# Patient Record
Sex: MALE | Race: WHITE | NOT HISPANIC OR LATINO | Employment: FULL TIME | URBAN - METROPOLITAN AREA
[De-identification: names, ages, dates, MRNs, and addresses within clinical notes are randomized per-mention and may not be internally consistent; named-entity substitution may affect disease eponyms.]

---

## 2019-10-17 ENCOUNTER — HOSPITAL ENCOUNTER (INPATIENT)
Facility: HOSPITAL | Age: 54
LOS: 12 days | Discharge: HOME | DRG: 753 | End: 2019-10-29
Attending: EMERGENCY MEDICINE | Admitting: PSYCHIATRY & NEUROLOGY
Payer: COMMERCIAL

## 2019-10-17 DIAGNOSIS — R45.851 DEPRESSION WITH SUICIDAL IDEATION: ICD-10-CM

## 2019-10-17 DIAGNOSIS — F10.10 ALCOHOL ABUSE: ICD-10-CM

## 2019-10-17 DIAGNOSIS — G25.9 EXTRAPYRAMIDAL AND MOVEMENT DISORDER: ICD-10-CM

## 2019-10-17 DIAGNOSIS — G25.81 RESTLESS LEG: ICD-10-CM

## 2019-10-17 DIAGNOSIS — F41.9 ANXIETY: ICD-10-CM

## 2019-10-17 DIAGNOSIS — R03.0 ELEVATED BLOOD PRESSURE READING: ICD-10-CM

## 2019-10-17 DIAGNOSIS — F31.5 BIPOLAR I DISORDER, MOST RECENT EPISODE DEPRESSED, SEVERE WITH PSYCHOTIC FEATURES (HCC): Primary | Chronic | ICD-10-CM

## 2019-10-17 DIAGNOSIS — F32.A DEPRESSION WITH SUICIDAL IDEATION: ICD-10-CM

## 2019-10-17 DIAGNOSIS — K70.0 ALCOHOL INDUCED FATTY LIVER: ICD-10-CM

## 2019-10-17 DIAGNOSIS — T14.91XA ATTEMPTED SUICIDE (HCC): ICD-10-CM

## 2019-10-17 DIAGNOSIS — F10.929 ALCOHOL INTOXICATION (HCC): ICD-10-CM

## 2019-10-17 LAB
ALBUMIN SERPL BCP-MCNC: 4 G/DL (ref 3–5.2)
ALP SERPL-CCNC: 62 U/L (ref 43–122)
ALT SERPL W P-5'-P-CCNC: 34 U/L (ref 9–52)
AMPHETAMINES SERPL QL SCN: POSITIVE
ANION GAP SERPL CALCULATED.3IONS-SCNC: 6 MMOL/L (ref 5–14)
AST SERPL W P-5'-P-CCNC: 32 U/L (ref 17–59)
BACTERIA UR QL AUTO: ABNORMAL /HPF
BARBITURATES UR QL: NEGATIVE
BASOPHILS # BLD AUTO: 0 THOUSANDS/ΜL (ref 0–0.1)
BASOPHILS NFR BLD AUTO: 1 % (ref 0–1)
BENZODIAZ UR QL: NEGATIVE
BILIRUB SERPL-MCNC: 0.4 MG/DL
BILIRUB UR QL STRIP: NEGATIVE
BUN SERPL-MCNC: 8 MG/DL (ref 5–25)
CALCIUM SERPL-MCNC: 8.7 MG/DL (ref 8.4–10.2)
CHLORIDE SERPL-SCNC: 107 MMOL/L (ref 97–108)
CLARITY UR: CLEAR
CO2 SERPL-SCNC: 29 MMOL/L (ref 22–30)
COCAINE UR QL: NEGATIVE
COLOR UR: ABNORMAL
CREAT SERPL-MCNC: 0.74 MG/DL (ref 0.7–1.5)
EOSINOPHIL # BLD AUTO: 0.1 THOUSAND/ΜL (ref 0–0.4)
EOSINOPHIL NFR BLD AUTO: 3 % (ref 0–6)
ERYTHROCYTE [DISTWIDTH] IN BLOOD BY AUTOMATED COUNT: 12.8 %
ETHANOL EXG-MCNC: 0.07 MG/DL
ETHANOL EXG-MCNC: 0.09 MG/DL
GFR SERPL CREATININE-BSD FRML MDRD: 106 ML/MIN/1.73SQ M
GLUCOSE SERPL-MCNC: 94 MG/DL (ref 70–99)
GLUCOSE UR STRIP-MCNC: NEGATIVE MG/DL
HCT VFR BLD AUTO: 42.6 % (ref 41–53)
HGB BLD-MCNC: 14.6 G/DL (ref 13.5–17.5)
HGB UR QL STRIP.AUTO: NEGATIVE
KETONES UR STRIP-MCNC: ABNORMAL MG/DL
LEUKOCYTE ESTERASE UR QL STRIP: 100
LYMPHOCYTES # BLD AUTO: 2.2 THOUSANDS/ΜL (ref 0.5–4)
LYMPHOCYTES NFR BLD AUTO: 44 % (ref 25–45)
MACROCYTES BLD QL AUTO: PRESENT
MAGNESIUM SERPL-MCNC: 2 MG/DL (ref 1.6–2.3)
MCH RBC QN AUTO: 35.3 PG (ref 26–34)
MCHC RBC AUTO-ENTMCNC: 34.2 G/DL (ref 31–36)
MCV RBC AUTO: 103 FL (ref 80–100)
METHADONE UR QL: NEGATIVE
MONOCYTES # BLD AUTO: 0.3 THOUSAND/ΜL (ref 0.2–0.9)
MONOCYTES NFR BLD AUTO: 7 % (ref 1–10)
MUCOUS THREADS UR QL AUTO: ABNORMAL
NEUTROPHILS # BLD AUTO: 2.3 THOUSANDS/ΜL (ref 1.8–7.8)
NEUTS SEG NFR BLD AUTO: 46 % (ref 45–65)
NITRITE UR QL STRIP: NEGATIVE
NON-SQ EPI CELLS URNS QL MICRO: ABNORMAL /HPF
OPIATES UR QL SCN: NEGATIVE
PCP UR QL: NEGATIVE
PH UR STRIP.AUTO: 5 [PH]
PLATELET # BLD AUTO: 284 THOUSANDS/UL (ref 150–450)
PLATELET BLD QL SMEAR: ADEQUATE
PMV BLD AUTO: 8.5 FL (ref 8.9–12.7)
POTASSIUM SERPL-SCNC: 4.1 MMOL/L (ref 3.6–5)
PROT SERPL-MCNC: 6.8 G/DL (ref 5.9–8.4)
PROT UR STRIP-MCNC: NEGATIVE MG/DL
RBC # BLD AUTO: 4.13 MILLION/UL (ref 4.5–5.9)
RBC #/AREA URNS AUTO: ABNORMAL /HPF
RBC MORPH BLD: NORMAL
SODIUM SERPL-SCNC: 142 MMOL/L (ref 137–147)
SP GR UR STRIP.AUTO: 1.02 (ref 1–1.04)
THC UR QL: NEGATIVE
UROBILINOGEN UA: NEGATIVE MG/DL
WBC # BLD AUTO: 5 THOUSAND/UL (ref 4.5–11)
WBC #/AREA URNS AUTO: ABNORMAL /HPF

## 2019-10-17 PROCEDURE — 81001 URINALYSIS AUTO W/SCOPE: CPT | Performed by: EMERGENCY MEDICINE

## 2019-10-17 PROCEDURE — 82075 ASSAY OF BREATH ETHANOL: CPT | Performed by: EMERGENCY MEDICINE

## 2019-10-17 PROCEDURE — 80307 DRUG TEST PRSMV CHEM ANLYZR: CPT | Performed by: EMERGENCY MEDICINE

## 2019-10-17 PROCEDURE — 93005 ELECTROCARDIOGRAM TRACING: CPT

## 2019-10-17 PROCEDURE — 99285 EMERGENCY DEPT VISIT HI MDM: CPT

## 2019-10-17 PROCEDURE — 85025 COMPLETE CBC W/AUTO DIFF WBC: CPT | Performed by: EMERGENCY MEDICINE

## 2019-10-17 PROCEDURE — 36415 COLL VENOUS BLD VENIPUNCTURE: CPT | Performed by: EMERGENCY MEDICINE

## 2019-10-17 PROCEDURE — 80053 COMPREHEN METABOLIC PANEL: CPT | Performed by: EMERGENCY MEDICINE

## 2019-10-17 PROCEDURE — 99285 EMERGENCY DEPT VISIT HI MDM: CPT | Performed by: EMERGENCY MEDICINE

## 2019-10-17 PROCEDURE — 83735 ASSAY OF MAGNESIUM: CPT | Performed by: EMERGENCY MEDICINE

## 2019-10-17 PROCEDURE — 87086 URINE CULTURE/COLONY COUNT: CPT | Performed by: EMERGENCY MEDICINE

## 2019-10-17 RX ORDER — LORAZEPAM 2 MG/1
2 TABLET ORAL ONCE
Status: COMPLETED | OUTPATIENT
Start: 2019-10-17 | End: 2019-10-17

## 2019-10-17 RX ORDER — HALOPERIDOL 5 MG
5 TABLET ORAL EVERY 8 HOURS PRN
Status: DISCONTINUED | OUTPATIENT
Start: 2019-10-17 | End: 2019-10-29 | Stop reason: HOSPADM

## 2019-10-17 RX ORDER — MAGNESIUM HYDROXIDE/ALUMINUM HYDROXICE/SIMETHICONE 120; 1200; 1200 MG/30ML; MG/30ML; MG/30ML
30 SUSPENSION ORAL EVERY 6 HOURS PRN
Status: ACTIVE | OUTPATIENT
Start: 2019-10-17 | End: 2019-10-19

## 2019-10-17 RX ORDER — LORAZEPAM 0.5 MG/1
1 TABLET ORAL ONCE
Status: COMPLETED | OUTPATIENT
Start: 2019-10-17 | End: 2019-10-17

## 2019-10-17 RX ORDER — IBUPROFEN 400 MG/1
400 TABLET ORAL EVERY 8 HOURS PRN
Status: DISCONTINUED | OUTPATIENT
Start: 2019-10-17 | End: 2019-10-18

## 2019-10-17 RX ORDER — BENZTROPINE MESYLATE 2 MG/1
2 TABLET ORAL EVERY 6 HOURS PRN
Status: DISCONTINUED | OUTPATIENT
Start: 2019-10-17 | End: 2019-10-18

## 2019-10-17 RX ORDER — ACETAMINOPHEN 325 MG/1
650 TABLET ORAL EVERY 6 HOURS PRN
Status: DISCONTINUED | OUTPATIENT
Start: 2019-10-17 | End: 2019-10-29 | Stop reason: HOSPADM

## 2019-10-17 RX ORDER — THIAMINE MONONITRATE (VIT B1) 100 MG
100 TABLET ORAL DAILY
Status: DISCONTINUED | OUTPATIENT
Start: 2019-10-17 | End: 2019-10-29 | Stop reason: HOSPADM

## 2019-10-17 RX ORDER — RISPERIDONE 1 MG/1
1 TABLET, ORALLY DISINTEGRATING ORAL EVERY 12 HOURS PRN
Status: DISCONTINUED | OUTPATIENT
Start: 2019-10-17 | End: 2019-10-29 | Stop reason: HOSPADM

## 2019-10-17 RX ORDER — LORAZEPAM 0.5 MG/1
1 TABLET ORAL EVERY 8 HOURS PRN
Status: DISCONTINUED | OUTPATIENT
Start: 2019-10-17 | End: 2019-10-17

## 2019-10-17 RX ORDER — ACETAMINOPHEN 325 MG/1
650 TABLET ORAL EVERY 4 HOURS PRN
Status: DISCONTINUED | OUTPATIENT
Start: 2019-10-17 | End: 2019-10-17

## 2019-10-17 RX ORDER — HALOPERIDOL 5 MG/ML
5 INJECTION INTRAMUSCULAR EVERY 8 HOURS PRN
Status: DISCONTINUED | OUTPATIENT
Start: 2019-10-17 | End: 2019-10-18

## 2019-10-17 RX ORDER — LORAZEPAM 0.5 MG/1
0.5 TABLET ORAL EVERY 8 HOURS PRN
Status: DISCONTINUED | OUTPATIENT
Start: 2019-10-17 | End: 2019-10-17

## 2019-10-17 RX ORDER — LORAZEPAM 1 MG/1
1 TABLET ORAL EVERY 6 HOURS PRN
Status: DISCONTINUED | OUTPATIENT
Start: 2019-10-17 | End: 2019-10-18

## 2019-10-17 RX ORDER — FOLIC ACID 1 MG/1
1 TABLET ORAL DAILY
Status: DISCONTINUED | OUTPATIENT
Start: 2019-10-17 | End: 2019-10-29 | Stop reason: HOSPADM

## 2019-10-17 RX ORDER — NICOTINE 21 MG/24HR
21 PATCH, TRANSDERMAL 24 HOURS TRANSDERMAL ONCE
Status: COMPLETED | OUTPATIENT
Start: 2019-10-17 | End: 2019-10-18

## 2019-10-17 RX ORDER — HYDROXYZINE HYDROCHLORIDE 25 MG/1
25 TABLET, FILM COATED ORAL EVERY 6 HOURS PRN
Status: DISCONTINUED | OUTPATIENT
Start: 2019-10-17 | End: 2019-10-18

## 2019-10-17 RX ORDER — ACETAMINOPHEN 325 MG/1
650 TABLET ORAL EVERY 8 HOURS PRN
Status: DISCONTINUED | OUTPATIENT
Start: 2019-10-17 | End: 2019-10-17

## 2019-10-17 RX ORDER — IBUPROFEN 400 MG/1
800 TABLET ORAL EVERY 8 HOURS PRN
Status: DISCONTINUED | OUTPATIENT
Start: 2019-10-17 | End: 2019-10-18

## 2019-10-17 RX ORDER — MAGNESIUM HYDROXIDE/ALUMINUM HYDROXICE/SIMETHICONE 120; 1200; 1200 MG/30ML; MG/30ML; MG/30ML
15 SUSPENSION ORAL EVERY 4 HOURS PRN
Status: DISCONTINUED | OUTPATIENT
Start: 2019-10-17 | End: 2019-10-17

## 2019-10-17 RX ORDER — ACETAMINOPHEN 325 MG/1
325 TABLET ORAL EVERY 6 HOURS PRN
Status: DISCONTINUED | OUTPATIENT
Start: 2019-10-17 | End: 2019-10-17

## 2019-10-17 RX ORDER — ATORVASTATIN CALCIUM 20 MG/1
40 TABLET, FILM COATED ORAL
Status: DISCONTINUED | OUTPATIENT
Start: 2019-10-17 | End: 2019-10-17

## 2019-10-17 RX ORDER — ACETAMINOPHEN 325 MG/1
650 TABLET ORAL EVERY 6 HOURS PRN
Status: DISCONTINUED | OUTPATIENT
Start: 2019-10-17 | End: 2019-10-17

## 2019-10-17 RX ORDER — BENZTROPINE MESYLATE 1 MG/ML
2 INJECTION INTRAMUSCULAR; INTRAVENOUS EVERY 6 HOURS PRN
Status: DISCONTINUED | OUTPATIENT
Start: 2019-10-17 | End: 2019-10-18

## 2019-10-17 RX ADMIN — LORAZEPAM 0.5 MG: 0.5 TABLET ORAL at 14:07

## 2019-10-17 RX ADMIN — LORAZEPAM 1 MG: 0.5 TABLET ORAL at 17:25

## 2019-10-17 RX ADMIN — Medication 1 TABLET: at 14:10

## 2019-10-17 RX ADMIN — FOLIC ACID 1 MG: 1 TABLET ORAL at 14:07

## 2019-10-17 RX ADMIN — NICOTINE 21 MG: 21 PATCH, EXTENDED RELEASE TRANSDERMAL at 14:06

## 2019-10-17 RX ADMIN — THIAMINE HCL TAB 100 MG 100 MG: 100 TAB at 14:07

## 2019-10-17 RX ADMIN — LORAZEPAM 2 MG: 1 TABLET ORAL at 21:16

## 2019-10-17 NOTE — ED NOTES
Patient presents via EMS from his work site where he as well as a supervisor reported he was attempting to hang himself  He was reportedly standing atop a truck with a makeshift noose artound his neck  Patient is a fair historian  There is a history of mental health treatment for bipolar disorder and a history of polysubstance abuse including alcohol, methamphetamines, benzodiazepines, opioids, amphetamines and marijuana  He minimizes all of these with the exception of alcohol , reporting that he consumes about a 5th of whiskey a day without any significant periods of sobriety  Patient states he has been dealing with depression since about age 11 or 8  He reports that about 10 yerars ago, he and his wife  and his depression worsened to the point that he had a hanging attempt  (11/2008)  PAtient reports he moved to the area for his current job and has been there for about 3 days  He stated that he recently learned his ex has hooked up with his best friend and this upset him  Patient's BAT was initially 0 089  UDS was positive for AMphetamine/methamphetamine, which he denies using  He reports drinking alcohol heavily beginning at about age 29  History indicates a history of regular blackouts  He denied he has experienced DT's but stated he had an alcohol withdrawal-related seizure about 2 months ago  Per the ED attending, per history, the patient had instead overdosed on opioids and was revivied with Narcan  In addition to depressed mood and suicidal thoughts with attempt, patient reports poor sleep, chronic worry and feelings of hopelessness and helplessness  He  has been tearful throughout his ED presentation  He reports he experienced hallucinations about a week  ago: "I saw shadows and heard things " He has no current outpatient provider or access to care in the area

## 2019-10-17 NOTE — ED PROVIDER NOTES
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  He states that he tied a rope around her neck and was ready to jump out of a truck  He states his co-worker stopped him  He is very tearful o exam   He is not homicidal, no visual hallucinations or auditory hallucinations  He states that his co-worker removed the rope around his neck and called EMS  Patient reports that he has had suicide attempts in the past where he broke his neck and back after attempting to hang himself  He does admit to several shots of whiskey this morning      History provided by:  EMS personnel and patient  Suicide Attempt   Presenting symptoms: depression, suicidal thoughts, suicidal threats and suicide attempt    Patient accompanied by: Friend/co-worker  Onset quality:  Unable to specify  Timing:  Constant  Progression:  Worsening  Chronicity:  Recurrent  Context: alcohol use and stressful life event    Treatment compliance:  Untreated  Time since last dose of psychoactive medication: Unknown  Relieved by:  Nothing  Worsened by:  Nothing  Ineffective treatments:  None tried  Associated symptoms: anhedonia, anxiety, decreased need for sleep, feelings of worthlessness, irritability and poor judgment    Risk factors: hx of mental illness and hx of suicide attempts        None       Past Medical History:   Diagnosis Date    Alcohol abuse     Depression     Psychiatric illness     Suicide attempt ([facility] [address] )        History reviewed   No pertinent surgical history  History reviewed  No pertinent family history  I have reviewed and agree with the history as documented  Social History     Tobacco Use    Smoking status: Current Every Day Smoker    Smokeless tobacco: Never Used   Substance Use Topics    Alcohol use: Yes     Frequency: 4 or more times a week     Binge frequency: Daily or almost daily     Comment: Patient reports 1/5 th whiskey daily    Drug use: Yes     Types: Heroin, Cocaine, Marijuana, Methamphetamines, Prescription, Amphetamines, Benzodiazepines, Oxycodone     Comment: Patient states "i've done it all, just not lately" UDS was positive for  meth/amphetamines        Review of Systems   Constitutional: Positive for irritability  Psychiatric/Behavioral: Positive for suicidal ideas  The patient is nervous/anxious  Physical Exam  Physical Exam   Constitutional: He is oriented to person, place, and time  He appears well-developed  No distress  Patient appears older than stated age  He smells of tobacco and alcohol   HENT:   Head: Normocephalic and atraumatic  Mouth/Throat: Oropharynx is clear and moist    Patient maintaining airway maintaining secretions  Uvula midline without edema  Airway pain  Eyes: Pupils are equal, round, and reactive to light  Conjunctivae and EOM are normal    Neck: Trachea normal and normal range of motion  Neck supple  No muscular tenderness present  No neck rigidity  Normal range of motion present  No thyroid mass present  Cardiovascular: Normal rate, regular rhythm, normal heart sounds and intact distal pulses  No murmur heard  Pulses:       Radial pulses are 2+ on the right side, and 2+ on the left side  Dorsalis pedis pulses are 2+ on the right side, and 2+ on the left side  Pulmonary/Chest: Effort normal and breath sounds normal  No stridor  No respiratory distress  Abdominal: Soft  Bowel sounds are normal  He exhibits no distension  There is no tenderness  Musculoskeletal: Normal range of motion  He exhibits no edema  Neurological: He is alert and oriented to person, place, and time  He has normal strength  No cranial nerve deficit or sensory deficit  GCS eye subscore is 4  GCS verbal subscore is 5  GCS motor subscore is 6  Patient is neuro intact with no focal deficits  No slurred speech  No facial asymmetry  GCS 15  No ataxia    Skin: Skin is warm  Capillary refill takes less than 2 seconds  He is not diaphoretic  Psychiatric: His mood appears anxious  His speech is tangential  He is withdrawn  He expresses impulsivity  He exhibits a depressed mood  He expresses suicidal ideation  He expresses suicidal plans  Patient is tearful throughout exam   He has poor eye contact  Nursing note and vitals reviewed        Vital Signs  ED Triage Vitals [10/17/19 1225]   Temperature Pulse Respirations Blood Pressure SpO2   98 °F (36 7 °C) 95 18 149/96 99 %      Temp Source Heart Rate Source Patient Position - Orthostatic VS BP Location FiO2 (%)   Tympanic Monitor Lying Left arm --      Pain Score       --           Vitals:    10/17/19 1225 10/17/19 1726   BP: 149/96 117/63   Pulse: 95 99   Patient Position - Orthostatic VS: Lying Lying         Visual Acuity      ED Medications  Medications   nicotine (NICODERM CQ) 21 mg/24 hr TD 24 hr patch 21 mg (21 mg Transdermal Medication Applied 10/17/19 1406)   thiamine (VITAMIN B1) tablet 100 mg (100 mg Oral Given 10/93/61 3161)   folic acid (FOLVITE) tablet 1 mg (1 mg Oral Given 10/17/19 1407)   multivitamin-minerals (CENTRUM) tablet 1 tablet (1 tablet Oral Given 10/17/19 1410)   acetaminophen (TYLENOL) tablet 650 mg (has no administration in time range)   ibuprofen (MOTRIN) tablet 400 mg (has no administration in time range)   aluminum-magnesium hydroxide-simethicone (MYLANTA) 200-200-20 mg/5 mL oral suspension 30 mL (has no administration in time range)   LORazepam (ATIVAN) tablet 1 mg (has no administration in time range) LORazepam (ATIVAN) tablet 1 mg (1 mg Oral Given 10/17/19 1725)       Diagnostic Studies  Results Reviewed     Procedure Component Value Units Date/Time    POCT alcohol breath test [[id number]]  (Normal) Resulted:  10/17/19 1440    Lab Status:  Edited Result - FINAL Updated:  10/17/19 1445     EXTBreath Alcohol 0 066    POCT alcohol breath test [[id number]]  (Normal) Resulted:  10/17/19 1320    Lab Status:  Final result Updated:  10/17/19 1440     EXTBreath Alcohol 0 089    Urine culture [[id number]] Collected:  10/17/19 1303    Lab Status: In process Specimen:  Urine, Clean Catch Updated:  10/17/19 1438    Urine Microscopic [[id number]]  (Abnormal) Collected:  10/17/19 1303    Lab Status:  Final result Specimen:  Urine, Clean Catch Updated:  10/17/19 1405     RBC, UA 0-1 /hpf      WBC, UA 10-20 /hpf      Epithelial Cells Occasional /hpf      Bacteria, UA Occasional /hpf      MUCUS THREADS Innumerable    Rapid drug screen, urine [[id number]]  (Abnormal) Collected:  10/17/19 1303    Lab Status:  Final result Specimen:  Urine, Clean Catch Updated:  10/17/19 1403     Amph/Meth UR Positive     Barbiturate Ur Negative     Benzodiazepine Urine Negative     Cocaine Urine Negative     Methadone Urine Negative     Opiate Urine Negative     PCP Ur Negative     THC Urine Negative    Narrative:       FOR MEDICAL PURPOSES ONLY  IF CONFIRMATION NEEDED PLEASE CONTACT THE LAB WITHIN 5 DAYS      Drug Screen Cutoff Levels:  AMPHETAMINE/METHAMPHETAMINES  1000 ng/mL  BARBITURATES     200 ng/mL  BENZODIAZEPINES     200 ng/mL  COCAINE      300 ng/mL  METHADONE      300 ng/mL  OPIATES      300 ng/mL  PHENCYCLIDINE     25 ng/mL  THC       50 ng/mL      Magnesium [[id number]]  (Normal) Collected:  10/17/19 1313    Lab Status:  Final result Specimen:  Blood from Arm, Right Updated:  10/17/19 1403     Magnesium 2 0 mg/dL     Comprehensive metabolic panel [[id number]]  (Normal) Collected:  10/17/19 1313    Lab Status:  Final result Specimen: Blood from Arm, Right Updated:  10/17/19 1402     Sodium 142 mmol/L      Potassium 4 1 mmol/L      Chloride 107 mmol/L      CO2 29 mmol/L      ANION GAP 6 mmol/L      BUN 8 mg/dL      Creatinine 0 74 mg/dL      Glucose 94 mg/dL      Calcium 8 7 mg/dL      AST 32 U/L      ALT 34 U/L      Alkaline Phosphatase 62 U/L      Total Protein 6 8 g/dL      Albumin 4 0 g/dL      Total Bilirubin 0 40 mg/dL      eGFR 106 ml/min/1 73sq m     Narrative:       [place] guidelines for Chronic Kidney Disease (CKD):     Stage 1 with normal or high GFR (GFR > 90 mL/min/1 73 square meters)    Stage 2 Mild CKD (GFR = 60-89 mL/min/1 73 square meters)    Stage 3A Moderate CKD (GFR = 45-59 mL/min/1 73 square meters)    Stage 3B Moderate CKD (GFR = 30-44 mL/min/1 73 square meters)    Stage 4 Severe CKD (GFR = 15-29 mL/min/1 73 square meters)    Stage 5 End Stage CKD (GFR <15 mL/min/1 73 square meters)  Note: GFR calculation is accurate only with a steady state creatinine    CBC and differential [[id number]]  (Abnormal) Collected:  10/17/19 1313    Lab Status:  Final result Specimen:  Blood from Arm, Right Updated:  10/17/19 1359     WBC 5 00 Thousand/uL      RBC 4 13 Million/uL      Hemoglobin 14 6 g/dL      Hematocrit 42 6 %       fL      MCH 35 3 pg      MCHC 34 2 g/dL      RDW 12 8 %      MPV 8 5 fL      Platelets 617 Thousands/uL      Neutrophils Relative 46 %      Lymphocytes Relative 44 %      Monocytes Relative 7 %      Eosinophils Relative 3 %      Basophils Relative 1 %      Neutrophils Absolute 2 30 Thousands/µL      Lymphocytes Absolute 2 20 Thousands/µL      Monocytes Absolute 0 30 Thousand/µL      Eosinophils Absolute 0 10 Thousand/µL      Basophils Absolute 0 00 Thousands/µL     UA w Reflex to Microscopic [[id number]]  (Abnormal) Collected:  10/17/19 1303    Lab Status:  Final result Specimen:  Urine, Clean Catch Updated:  10/17/19 1340     Color, UA [place]     Clarity, UA Clear     Specific [place], UA 1 020     pH, UA 5 0     Leukocytes,  0     Nitrite, UA Negative     Protein, UA Negative mg/dl      Glucose, UA Negative mg/dl      Ketones, UA 5 (Trace) mg/dl      Bilirubin, UA Negative     Blood, UA Negative     UROBILINOGEN UA Negative mg/dL                  No orders to display              Procedures  Procedures       ED Course  ED Course as of Oct 17 1927   u Oct 17, 2019   1228 Patient is a 72-year-old male coming in today with suicidal ideations and attempt  He is very tearful on exam and smells of alcohol  Will place in suicide precautions as well as start medical workup  He has no obvious injuries or deformities  Patient did have an attempt  If he does not willingly signed 201 I would Uphold 302    Portions of the record may have been created with voice recognition software  Occasional wrong word or "sound a like" substitutions may have occurred due to the inherent limitations of voice recognition software  Read the chart carefully and recognize, using context, where substitutions have occurred  1329 Patient with BAT 0 89  Drinks daily and "eye opener before work"  Will place CIWA protocol and ativan prn      1353 Reviewed chart and patient has long history of narcotic and alcohol abuse as well as multiple inpatient treatment for suicidal ideations and attempts  Patient most recently was found unresponsive in August 2019 after  found him and brought to the ER  Patient was given Narcan  Patient did sign out AMA at that time  Please review chart review for such  I did review  [facility] [place], [address] [place] [address] and found no indication for patient      1406 Will recheck BAT at 2 hour jaky  Labs consistent with ETOH abuse and will send Urine CX  Otherwise no acute end-organ damage      1451 ETOH < 80  Medically clear for crisis evaluation  Patient was given ativan and resting more comfortably       1632 Crisis is in for evaluation        [id number] Patient did sign a 201  Will IOIUJS 153      8414 Patient denies history of HLD        1902 Patient resting in bed more comfortable  MDM  Number of Diagnoses or Management Options  Diagnosis management comments:     EKG INTERPRETATION at 1:16 p m  RHYTHM:  Normal sinus rhythm at 82 beats per minute  AXIS:  Normal axis  INTERVALS:  SC interval measured at 146 milliseconds  QRS COMPLEX:  QRS measured at 94 milliseconds  ST SEGMENT:  Nonspecific ST segment changes  Diffuse artifact  QT INTERVAL:  QTC measured at 432 milliseconds  COMPARED WITH PRIOR   Bless[facility]ala Interpretation by Sarah Ruggiero DO         Amount and/or Complexity of Data Reviewed  Clinical lab tests: ordered  Tests in the medicine section of CPT®: ordered        Disposition  Final diagnoses:   Attempted suicide ([facility] [address] )   Depression with suicidal ideation   Alcohol abuse   Alcohol intoxication ([facility] )   Elevated blood pressure reading     Time reflects when diagnosis was documented in both MDM as applicable and the Disposition within this note     Time User Action Codes Description Comment    10/17/2019  1:22 PM Bendock, Leticia L Add Wendi Handykeeley Attempted suicide ([facility] [address] )     10/17/2019  1:22 PM Leticia Human Add [F32 9,  C52 171] Depression with suicidal ideation     10/17/2019  2:07 PM Bendnatividad Leticia L Add [F10 10] Alcohol abuse     10/17/2019  2:07 PM Bendock, Leticia L Add [F10 929] Alcohol intoxication ([facility] )     10/17/2019  2:07 PM Nolvia Linton L Add [R03 0] Elevated blood pressure reading       ED Disposition     ED Disposition Condition Date/Time Comment    Transfer to [facility] Oct 17, 2019  7:26 PM Queenienani Valverde should be transferred out to [address] [place] [address] Dr Taryn Cabral and has been medically cleared  MD Documentation      Most Recent Value   Sending MD Sarah Ruggiero DO      Follow-up Information    None         Patient's Medications    No medications on file     No discharge procedures on file      ED Provider  Electronically Signed by           Niranjan Dean DO  10/17/19 1927

## 2019-10-17 NOTE — ED NOTES
The patient is not insured  EVS (Eligibility Verification System) called - [phone]  Automated system indicates: Not on File

## 2019-10-17 NOTE — ED NOTES
Patient is accepted at [facility]  Patient is accepted by Dr Deepika Mohr per Damon Man     Patient may go to the floor as soon as report is called         Nurse report is to be called to [phone] prior to patient transfer

## 2019-10-18 PROBLEM — F43.10 POST TRAUMATIC STRESS DISORDER (PTSD): Status: ACTIVE | Noted: 2017-11-01

## 2019-10-18 PROBLEM — F43.12 POST-TRAUMATIC STRESS DISORDER, CHRONIC: Chronic | Status: ACTIVE | Noted: 2017-11-01

## 2019-10-18 PROBLEM — F10.20 ALCOHOL USE DISORDER, SEVERE, DEPENDENCE (HCC): Chronic | Status: ACTIVE | Noted: 2017-10-31

## 2019-10-18 PROBLEM — F10.20 ALCOHOL USE DISORDER, SEVERE, DEPENDENCE (HCC): Status: ACTIVE | Noted: 2017-10-31

## 2019-10-18 PROBLEM — Z00.8 MEDICAL CLEARANCE FOR PSYCHIATRIC ADMISSION: Status: ACTIVE | Noted: 2019-10-18

## 2019-10-18 PROBLEM — R91.1 PULMONARY NODULE: Status: ACTIVE | Noted: 2019-10-18

## 2019-10-18 PROBLEM — E78.5 HYPERLIPIDEMIA: Status: ACTIVE | Noted: 2017-11-02

## 2019-10-18 PROBLEM — F31.9 BIPOLAR DEPRESSION (HCC): Chronic | Status: ACTIVE | Noted: 2017-10-30

## 2019-10-18 PROBLEM — K70.0 ALCOHOL INDUCED FATTY LIVER: Status: ACTIVE | Noted: 2017-11-02

## 2019-10-18 PROBLEM — F81.9 LEARNING DISABILITY: Chronic | Status: ACTIVE | Noted: 2019-10-18

## 2019-10-18 PROBLEM — F31.9 BIPOLAR DEPRESSION (HCC): Status: ACTIVE | Noted: 2017-10-30

## 2019-10-18 PROBLEM — F12.10 CANNABIS ABUSE, EPISODIC: Chronic | Status: ACTIVE | Noted: 2019-10-18

## 2019-10-18 PROBLEM — F31.4 BIPOLAR I DISORDER, MOST RECENT EPISODE DEPRESSED, SEVERE WITHOUT PSYCHOTIC FEATURES (HCC): Chronic | Status: ACTIVE | Noted: 2017-10-30

## 2019-10-18 PROBLEM — F15.10 METHAMPHETAMINE ABUSE, EPISODIC (HCC): Chronic | Status: ACTIVE | Noted: 2019-10-18

## 2019-10-18 PROBLEM — F43.10 POST TRAUMATIC STRESS DISORDER (PTSD): Chronic | Status: ACTIVE | Noted: 2017-11-01

## 2019-10-18 PROBLEM — F17.210 CIGARETTE NICOTINE DEPENDENCE WITHOUT COMPLICATION: Status: ACTIVE | Noted: 2017-11-01

## 2019-10-18 LAB
ATRIAL RATE: 82 BPM
BACTERIA UR CULT: NORMAL
P AXIS: 60 DEGREES
PR INTERVAL: 146 MS
QRS AXIS: 56 DEGREES
QRSD INTERVAL: 94 MS
QT INTERVAL: 370 MS
QTC INTERVAL: 432 MS
T WAVE AXIS: 51 DEGREES
VENTRICULAR RATE: 82 BPM

## 2019-10-18 PROCEDURE — 99222 1ST HOSP IP/OBS MODERATE 55: CPT | Performed by: PSYCHIATRY & NEUROLOGY

## 2019-10-18 PROCEDURE — 93010 ELECTROCARDIOGRAM REPORT: CPT | Performed by: INTERNAL MEDICINE

## 2019-10-18 PROCEDURE — 99251 PR INITL INPATIENT CONSULT NEW/ESTAB PT 20 MIN: CPT | Performed by: NURSE PRACTITIONER

## 2019-10-18 RX ORDER — LORAZEPAM 1 MG/1
2 TABLET ORAL 4 TIMES DAILY
Status: COMPLETED | OUTPATIENT
Start: 2019-10-18 | End: 2019-10-19

## 2019-10-18 RX ORDER — LORAZEPAM 1 MG/1
1 TABLET ORAL EVERY 6 HOURS PRN
Status: DISCONTINUED | OUTPATIENT
Start: 2019-10-18 | End: 2019-10-18

## 2019-10-18 RX ORDER — BENZTROPINE MESYLATE 1 MG/1
1 TABLET ORAL EVERY 6 HOURS PRN
Status: DISCONTINUED | OUTPATIENT
Start: 2019-10-18 | End: 2019-10-18

## 2019-10-18 RX ORDER — ATORVASTATIN CALCIUM 40 MG/1
40 TABLET, FILM COATED ORAL DAILY
Status: DISCONTINUED | OUTPATIENT
Start: 2019-10-18 | End: 2019-10-29 | Stop reason: HOSPADM

## 2019-10-18 RX ORDER — DIVALPROEX SODIUM 500 MG/1
500 TABLET, DELAYED RELEASE ORAL EVERY 12 HOURS
Status: DISCONTINUED | OUTPATIENT
Start: 2019-10-18 | End: 2019-10-19

## 2019-10-18 RX ORDER — LORAZEPAM 1 MG/1
2 TABLET ORAL EVERY 6 HOURS PRN
Status: DISCONTINUED | OUTPATIENT
Start: 2019-10-18 | End: 2019-10-28

## 2019-10-18 RX ORDER — HYDROXYZINE HYDROCHLORIDE 25 MG/1
25 TABLET, FILM COATED ORAL EVERY 6 HOURS PRN
Status: DISCONTINUED | OUTPATIENT
Start: 2019-10-18 | End: 2019-10-29 | Stop reason: HOSPADM

## 2019-10-18 RX ORDER — QUETIAPINE FUMARATE 300 MG/1
300 TABLET, FILM COATED ORAL
COMMUNITY
Start: 2019-01-14 | End: 2019-10-29 | Stop reason: HOSPADM

## 2019-10-18 RX ORDER — IBUPROFEN 400 MG/1
400 TABLET ORAL EVERY 8 HOURS PRN
Status: DISCONTINUED | OUTPATIENT
Start: 2019-10-18 | End: 2019-10-18

## 2019-10-18 RX ORDER — IBUPROFEN 600 MG/1
600 TABLET ORAL EVERY 6 HOURS PRN
Status: DISCONTINUED | OUTPATIENT
Start: 2019-10-18 | End: 2019-10-29 | Stop reason: HOSPADM

## 2019-10-18 RX ORDER — LORAZEPAM 2 MG/ML
1 INJECTION INTRAMUSCULAR EVERY 6 HOURS PRN
Status: DISCONTINUED | OUTPATIENT
Start: 2019-10-18 | End: 2019-10-29 | Stop reason: HOSPADM

## 2019-10-18 RX ORDER — ATORVASTATIN CALCIUM 40 MG/1
40 TABLET, FILM COATED ORAL DAILY
COMMUNITY
Start: 2019-01-14

## 2019-10-18 RX ORDER — CITALOPRAM 20 MG/1
20 TABLET ORAL DAILY
COMMUNITY
Start: 2010-04-06 | End: 2019-10-29 | Stop reason: HOSPADM

## 2019-10-18 RX ORDER — LORAZEPAM 1 MG/1
2 TABLET ORAL 3 TIMES DAILY
Status: DISCONTINUED | OUTPATIENT
Start: 2019-10-20 | End: 2019-10-21

## 2019-10-18 RX ORDER — TRAZODONE HYDROCHLORIDE 50 MG/1
50 TABLET ORAL
Status: DISCONTINUED | OUTPATIENT
Start: 2019-10-18 | End: 2019-10-29 | Stop reason: HOSPADM

## 2019-10-18 RX ORDER — BENZTROPINE MESYLATE 1 MG/ML
1 INJECTION INTRAMUSCULAR; INTRAVENOUS EVERY 6 HOURS PRN
Status: DISCONTINUED | OUTPATIENT
Start: 2019-10-18 | End: 2019-10-29 | Stop reason: HOSPADM

## 2019-10-18 RX ORDER — OLANZAPINE 10 MG/1
10 TABLET, ORALLY DISINTEGRATING ORAL
Status: DISCONTINUED | OUTPATIENT
Start: 2019-10-18 | End: 2019-10-29 | Stop reason: HOSPADM

## 2019-10-18 RX ORDER — HYDROXYZINE 50 MG/1
50 TABLET, FILM COATED ORAL EVERY 6 HOURS PRN
Status: DISCONTINUED | OUTPATIENT
Start: 2019-10-18 | End: 2019-10-29 | Stop reason: HOSPADM

## 2019-10-18 RX ORDER — OLANZAPINE 10 MG/1
10 INJECTION, POWDER, LYOPHILIZED, FOR SOLUTION INTRAMUSCULAR
Status: DISCONTINUED | OUTPATIENT
Start: 2019-10-18 | End: 2019-10-29 | Stop reason: HOSPADM

## 2019-10-18 RX ORDER — BENZTROPINE MESYLATE 1 MG/1
1 TABLET ORAL EVERY 6 HOURS PRN
Status: DISCONTINUED | OUTPATIENT
Start: 2019-10-18 | End: 2019-10-29 | Stop reason: HOSPADM

## 2019-10-18 RX ORDER — QUETIAPINE FUMARATE 100 MG/1
100 TABLET, FILM COATED ORAL
Status: COMPLETED | OUTPATIENT
Start: 2019-10-18 | End: 2019-10-18

## 2019-10-18 RX ORDER — IBUPROFEN 400 MG/1
800 TABLET ORAL EVERY 8 HOURS PRN
Status: DISCONTINUED | OUTPATIENT
Start: 2019-10-18 | End: 2019-10-29 | Stop reason: HOSPADM

## 2019-10-18 RX ORDER — LORAZEPAM 1 MG/1
1 TABLET ORAL EVERY 6 HOURS PRN
Status: DISCONTINUED | OUTPATIENT
Start: 2019-10-18 | End: 2019-10-29 | Stop reason: HOSPADM

## 2019-10-18 RX ORDER — HALOPERIDOL 5 MG/ML
5 INJECTION INTRAMUSCULAR EVERY 8 HOURS PRN
Status: DISCONTINUED | OUTPATIENT
Start: 2019-10-18 | End: 2019-10-29 | Stop reason: HOSPADM

## 2019-10-18 RX ORDER — DIVALPROEX SODIUM 500 MG/1
500 TABLET, DELAYED RELEASE ORAL EVERY 12 HOURS
COMMUNITY
Start: 2019-01-14 | End: 2019-10-29 | Stop reason: HOSPADM

## 2019-10-18 RX ORDER — LORAZEPAM 1 MG/1
2 TABLET ORAL 3 TIMES DAILY
Status: DISCONTINUED | OUTPATIENT
Start: 2019-10-18 | End: 2019-10-18

## 2019-10-18 RX ADMIN — Medication 1 TABLET: at 10:48

## 2019-10-18 RX ADMIN — LORAZEPAM 2 MG: 1 TABLET ORAL at 17:27

## 2019-10-18 RX ADMIN — LORAZEPAM 2 MG: 1 TABLET ORAL at 10:48

## 2019-10-18 RX ADMIN — LORAZEPAM 2 MG: 1 TABLET ORAL at 21:38

## 2019-10-18 RX ADMIN — FOLIC ACID 1 MG: 1 TABLET ORAL at 10:48

## 2019-10-18 RX ADMIN — ATORVASTATIN CALCIUM 40 MG: 40 TABLET, FILM COATED ORAL at 10:47

## 2019-10-18 RX ADMIN — HYDROXYZINE HYDROCHLORIDE 50 MG: 50 TABLET ORAL at 19:22

## 2019-10-18 RX ADMIN — QUETIAPINE FUMARATE 100 MG: 100 TABLET ORAL at 21:38

## 2019-10-18 RX ADMIN — DIVALPROEX SODIUM 500 MG: 500 TABLET, DELAYED RELEASE ORAL at 12:20

## 2019-10-18 RX ADMIN — LORAZEPAM 2 MG: 1 TABLET ORAL at 12:19

## 2019-10-18 RX ADMIN — THIAMINE HCL TAB 100 MG 100 MG: 100 TAB at 10:48
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
## 2019-10-18 NOTE — PROGRESS NOTES
10/18/19 0800   Team Meeting   Meeting Type Daily Rounds   Team Members Present   Team Members Present Physician;Nurse;;; Other (Discipline and Name)   Physician Team Member Dr Shandra Barillas Team Member [facility] Management Team Member Sam   Social Work Team Member Michael Hernandez   Patient/Family Present   Patient Present No   Patient's Family Present No   Ativan scheduled added to medication

## 2019-10-18 NOTE — PROGRESS NOTES
Patient has been in bed sleeping as much as possible this morning and cooperative but scant when awakened  Reported withdrawal symptoms of tremors, "waves" in his vision, anxiety, and mild confusion  Patient's CIWA was 11 this morning  Patient given newly ordered 2 mg Ativan po  Patient presented as depressed and tearful with poor eye contact  Aj Forte he is very depressed and continues to have suicidal ideation, though he said he would come to staff before acting on such thoughts  RN will continue to monitor him

## 2019-10-18 NOTE — TREATMENT PLAN
TREATMENT PLAN REVIEW - [facility] Kristin Cherry 48 y o  1965 male MRN: [id number]    [address] [address] [place] [address] Encounter: [id number]        Admit Date/Time:  10/17/2019 12:22 PM    Treatment Team: Attending Provider: Zahira Ayala MD; Consulting Physician: Rolando Lawrence MD; Patient Care Assistant: Luis Antonio Fernandez; Patient Care Technician: Ade Pisano; Registered Nurse: Carlos A Mcmahon RN; Patient Care Technician: Arun Ramirez; : Isi Sánchez RN; Patient Care Technician: Judith Nova    Diagnosis: Principal Problem:    Bipolar I disorder, most recent episode depressed, severe with psychotic features [facility])  Active Problems:    Post-traumatic stress disorder, chronic    Uncomplicated alcohol dependence ([facility] )    Cannabis abuse, episodic    Learning disability    Methamphetamine abuse, episodic [facility] )    Hyperlipidemia    Cigarette nicotine dependence without complication    Medical clearance for psychiatric admission    Patient Strengths/Assets: ability for insight, motivated, negotiates basic needs, patient is on a voluntary commitment      Patient Barriers/Limitations: difficulty adapting, marital conflict, substance abuse    Short Term Goals: decrease in depressive symptoms, decrease in psychotic symptoms, decrease in suicidal thoughts, control of withdrawal symptoms    Long Term Goals: resolution of depressive symptoms, stabilization of mood, free of suicidal thoughts, resolution of psychotic symptoms, resolution of withdrawal symptoms, adequate sleep, adequate appetite    Progress Towards Goals: starting psychiatric medications as prescribed, starting alcohol detoxification protocol    Recommended Treatment: medication management, patient medication education, group therapy, milieu therapy, continued Behavioral Health psychiatric evaluation/assessment process     Treatment Frequency: daily medication monitoring, group and milieu therapy daily, monitoring through interdisciplinary rounds, monitoring through weekly patient care conferences    Expected Discharge Date: 10 days - 10/28/2019    Discharge Plan: referral for outpatient medication management with a psychiatrist, referral for outpatient psychotherapy, return to previous living arrangement    Treatment Plan Created/Updated By: Cynthia Ribeiro MD

## 2019-10-18 NOTE — CASE MANAGEMENT
Pt resides at [facility] and [address] [place] [address], [address] [facility] [address] [facility] [address]

## 2019-10-18 NOTE — PLAN OF CARE
Problem: Depression  Goal: Refrain from harming self  Description  Interventions:  - Monitor patient closely, per order   - Supervise medication ingestion, monitor effects and side effects   Outcome: Progressing     Problem: SUBSTANCE USE/ABUSE  Goal: By discharge, patient will have ongoing treatment plan addressing chemical dependency  Description  INTERVENTIONS:  - Assist patient with resources and/or appointments for ongoing recovery based living  Outcome: Progressing     Problem: Nutrition/Hydration-ADULT  Goal: Nutrient/Hydration intake appropriate for improving, restoring or maintaining nutritional needs  Description  Monitor and assess patient's nutrition/hydration status for malnutrition  Collaborate with interdisciplinary team and initiate plan and interventions as ordered  Monitor patient's weight and dietary intake as ordered or per policy  Utilize nutrition screening tool and intervene as necessary  Determine patient's food preferences and provide high-protein, high-caloric foods as appropriate       INTERVENTIONS:  - Monitor oral intake, urinary output, labs, and treatment plans  - Assess nutrition and hydration status and recommend course of action  - Evaluate amount of meals eaten  - Assist patient with eating if necessary   - Allow adequate time for meals  - Recommend/ encourage appropriate diets, oral nutritional supplements, and vitamin/mineral supplements  - Order, calculate, and assess calorie counts as needed  - Recommend, monitor, and adjust tube feedings and TPN/PPN based on assessed needs  - Assess need for intravenous fluids  - Provide specific nutrition/hydration education as appropriate  - Include patient/family/caregiver in decisions related to nutrition  Outcome: Progressing     Problem: Depression  Goal: Treatment Goal: Demonstrate behavioral control of depressive symptoms, verbalize feelings of improved mood/affect, and adopt new coping skills prior to discharge  Outcome: Not Progressing  Goal: Verbalize thoughts and feelings  Description  Interventions:  - Assess and re-assess patient's level of risk   - Engage patient in 1:1 interactions, daily, for a minimum of 15 minutes   - Encourage patient to express feelings, fears, frustrations, hopes   Outcome: Not Progressing  Goal: Refrain from isolation  Description  Interventions:  - Develop a trusting relationship   - Encourage socialization   Outcome: Not Progressing  Goal: Refrain from self-neglect  Outcome: Not Progressing     Problem: SUBSTANCE USE/ABUSE  Goal: Will have no detox symptoms and will verbalize plan for changing substance-related behavior  Description  INTERVENTIONS:  - Monitor physical status and assess for symptoms of withdrawal  - Administer medication as ordered  - Provide emotional support with 1 on 1 interaction with staff  - Encourage recovery focused program/ addiction education  - Assess for verbalization of changing behaviors related to substance abuse  - Initiate consults and referrals as appropriate (Case Management, Spiritual Care, etc )  Outcome: Not Progressing

## 2019-10-18 NOTE — ASSESSMENT & PLAN NOTE
Smoking cessation advised, patient reports that he smokes approximately 1 pack of cigarettes a day, no intentions of quitting at this time

## 2019-10-18 NOTE — PLAN OF CARE
Problem: Depression  Goal: Verbalize thoughts and feelings  Description  Interventions:  - Assess and re-assess patient's level of risk   - Engage patient in 1:1 interactions, daily, for a minimum of 15 minutes   - Encourage patient to express feelings, fears, frustrations, hopes   Outcome: Not Progressing  Goal: Refrain from harming self  Description  Interventions:  - Monitor patient closely, per order   - Supervise medication ingestion, monitor effects and side effects   Outcome: Not Progressing  Goal: Refrain from isolation  Description  Interventions:  - Develop a trusting relationship   - Encourage socialization   Outcome: Not Progressing     Problem: SUBSTANCE USE/ABUSE  Goal: Will have no detox symptoms and will verbalize plan for changing substance-related behavior  Description  INTERVENTIONS:  - Monitor physical status and assess for symptoms of withdrawal  - Administer medication as ordered  - Provide emotional support with 1 on 1 interaction with staff  - Encourage recovery focused program/ addiction education  - Assess for verbalization of changing behaviors related to substance abuse  - Initiate consults and referrals as appropriate (Case Management, Spiritual Care, etc )  Outcome: Not Progressing  Goal: By discharge, will develop insight into their chemical dependency and sustain motivation to continue in recovery  Description  INTERVENTIONS:  - Attends all daily group sessions and scheduled AA groups  - Actively practices coping skills through participation in the therapeutic community and adherence to program rules  - Reviews and completes assignments from individual treatment plan  - Assist patient development of understanding of their personal cycle of addiction and relapse triggers  Outcome: Not Progressing  Goal: By discharge, patient will have ongoing treatment plan addressing chemical dependency  Description  INTERVENTIONS:  - Assist patient with resources and/or appointments for ongoing recovery based living  Outcome: Not Progressing     Problem: DISCHARGE PLANNING  Goal: Discharge to home or other facility with appropriate resources  Description  INTERVENTIONS:  - Identify barriers to discharge w/patient and caregiver  - Arrange for needed discharge resources and transportation as appropriate  - Identify discharge learning needs (meds, wound care, etc )  - Refer to Case Management Department for coordinating discharge planning if the patient needs post-hospital services based on physician/advanced practitioner order or complex needs related to functional status, cognitive ability, or social support system   Outcome: Not Progressing

## 2019-10-18 NOTE — QUICK NOTE
Called by RN to evaluate patient on admission for ETOH withdrawal  Initial reports indicated he was experiencing hallucination, moderate tremors, diaphoresis, for which Ativan 2mg PO x1 was ordered  On my assessment approximately 15 minutes later, the patient is walking in the hallway in NAD  He exhibits very mild shaking of BL hands, no gait dysfunction, clear speech  He denies hallucinations, anxiety or LAU  He admits to drinking a fifth of liquor per day, with his last drink being this morning  He states he has been through ETOH withdrawal previously and during which, he experiencing "the shakes"  His only complaint at this time is that he is tired and wishes to go to bed  CIWA score at this time: 2    CIWA protocol ordered in conjunction with existing orders for PO folic acid and thiamine QD  Please alert on-call practitioner prior to administration of Ativan  RN staff aware they may call me for questions, updates or concerns

## 2019-10-18 NOTE — PROGRESS NOTES
Patient ate lunch and then to returned to his room to sleep  Reported some improvement in his tremors and anxiety  Continues to be scant and depressed--again agreed to come to staff before hurting himself
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
## 2019-10-18 NOTE — DISCHARGE INSTR - OTHER ORDERS
[address]:   1  Obtain a housing voucher at the [address] [place] [address] [place]  2  Photo identification will be required  3  Come to the [address] [place] [address] O between 3:30 P  M  and 7:30 P M  (Dinner is served at 5:30 P M )  Para poder ser [employer] en esta MISSION tienes que hacer lo siguiente:  1  Tienes que obtener un boleto (voucher) de en la estacion de policia mcconnell [address]  2  Marrian Daring con retrato es requerida  3  La entrada a la [place] es de 3:30 P M  Y 7:30 P M  (La comida sera servida a las 5:30 P M )    HOW TO GET SUBSTANCE ABUSE HELP:  If you or someone you know has a drug or alcohol problem, there is help:  Ed 44: [facility] Alcohol Abuse Services: [phone]  An assessment is the first step  In addition to those listed there are other programs available in the area but assessment is best to determine an appropriate level of care  If you DO NOT have Medical Assistance (MA) or [employer], an assessment can be scheduled at one of these providers:  [address] [place] [address] [place] [facility], 2275  [address] [phone]   [facility] [address] , Þ[facility], 2[address]  [facility] [address] [place], [place] [address]A[facility]  [phone]   Step by 8012 [facility] [address] , [place], 98 [facility]  [phone]   Treatment Trends  Confront  1320 [facility] , [place], 98 [facility]  2000 [facility],[address] , [address], [place], [address] Chaudhary [phone]     If you [address], an assessment can be scheduled at one of these providers:  [place] on Alcohol & Drug Abuse  [address] , [place], 98 [facility] 6632 [address]  1900 [place], [address]  D&A Intake Unit  620 [facility] [address] 1st Floor, [place], [address]  [address], 300 [facility],6Th Floor, [place], [address] [place] [address] , [facility], [address]  [address], 122 [facility]) [facility] 57 [facility], Dewy Rose, [address] 253 [facility] 721 [facility], [address]   Step by 8012 [facility] [address] , [facility], 98 [facility]  [phone]   Treatment Trends  [place]  1320 [facility], 98 [facility]  2000 [facility],[address] , [address], [facility], [address] [facility] [phone]     If you [address] [facility], an assessment can be scheduled at one of these providers  Please contact these Providers to determine if they are in your network plan:  [facility] D&A Intake Unit  620 [facility] [address] , 1st Floor, [place], [address]  [address] , [facility], [address]  [address], 122 [facility]) [place] 57 [facility], Dewy Rose, [address] 253 [facility] 721 [facility], [address] One [facility] [address] , [address], [facility], [address] Crisis Phone Number : 401 [address] [place] [address] Crisis Phone Number : [phone]    Harry 86 : [phone]    Crisis Text Line : [phone]        [place] prescription drug assistance programs  [place] offers a variety of ways to get help with medications and prescription drugs  Programs provide free or discounted medications    PACE and [facility] Prescription Assistance  Both of these plans provide very extensive coverage and prescription coverage to older [place]  The assistance program will help pay for most prescription medications, including syringes, insulin needles, and insulin  The programs do have some limits  For example, the programs do not pay for or cover medical equipment, over-the-counter medicines, doctor, dental, vision, or hospital visits or services  One benefit is that there are  no application fees or ongoing monthly fees to enroll in the program  Dial [phone]  PACE Plus Medicare  This is a program that was created for the purpose of converting the states existing drug assistance plans into a new and improved supplemental program that wraps around the currently existing private Medicare Part D prescription drug plans  PACE Plus Medicare gives the [employer] the authority to act as a representative for both its PACENET and PACE assistance plan enrollees in matters relating to overall Medicare Part D plans, and it enrolls the beneficiaries into the existing government Medicare Part D plans, it will help pay Part D premiums, and the plan will also assist with applying for lower income subsidies that may be needed on behalf of both BRUCE and Antoinette 3073 assistance program members  [phone] Patient Assistance Program Clearinghouse (PA PAP)   This is a service in which the state coordinates with drug manufacturers and medical companies in administering their patient assistance programs  What occurs is that many pharmaceutical companies offer lower priced medications for families and individuals who meet specific income qualifications and who do not otherwise have access to necessary long-term medications to meet their health care needs  While not all manufacturers participate, and not all drugs are covered, The [place] patient assistance programs is another great choice   Click here to learn more on PAP, or call [phone] to apply  Prescription Assistance  [employer]   4-724.545.6439  Call for information on Patient Assistance Programs  Drugs produced by this company include Abilify, Buspar, Desyrel, Prolixin, Serzone, and Sinemet  [address] [address] [place] [address]  [phone]  Helps in emergency/crisis situation with a medical prescription cost   Please call first    [address]   Everyone is eligible! Find out information about this program    The Medicine Program ([address] [place] [address])   Enrollment in one or more of the Patient Assistance Programs available nationwide for regularly prescribed meds at free or reduced fee  Must meet eligibility requirements  Needy Meds  Interactive web site and database of pharmaceutical companies/programs that offer assistance to individuals at low or no-cost who are unable to afford prescription drugs and who are uninsured or underinsured  PA Prescription Price [employer] of many medication at different pharmacies convenient to you  Partnership for Prescription Assistance (PPA)   2-280-5-WFP-OEO (5-666.583.5135)  Single point of access to more than 819 public and private patient assistance programs  RX Assist   A comprehensive database of patient assistance programs as well as practical tools, news and articles so that health care professionals and patients can find the information they need   all in one place  RX Hope   Manages and consults on patient assistance programs for pharmaceutical companies of all sizes  Rx Outreach  0-144.963.3545  Patient Assistance Program that offers a safe, easy, and affordable way for people of all ages to get medicines they need  Contains more than 50 FBS-approved generic medicines to treat ongoing health conditions, such as diabetes, asthma, high blood pressure, and depression  Must meet income eligibility guidelines  [employer]  0-031-638-818-460-5045 Monday-Friday, 8:30 am - 5:30 pm ET  Call for information on patient assistance program   Medications produced by this company include Compazine, Paxil, Stelazine, Thorazine, Lithium, Parnate  Special Pharmaceutical Benefits Program (SPBP) [place] DHS  Helps eligible low- and moderate-income individuals and families pay for specific drug therapies used for the treatment of persons with HIV/AIDS or a DSM-IV diagnosis for schizophrenia  Follow up with your Medical Assistance Application                             [address] [place] [address], [address] Drug and Alcohol [address] Epstein, [address]   [address] [place] [address], [facility]  [phone]  Financial Help Provided: SNAP, [employer], Food [place], Welfare Office, [facility]  Please visit this office to complete your application for [place] [employer]

## 2019-10-18 NOTE — ASSESSMENT & PLAN NOTE
Will get updated lipid panel, will restart patient Lipitor  Recommend healthy lifestyle choices for your cholesterol  Low fat/low cholesterol diet  Limit/avoid red meat  Eat more lean meat - chicken breast, ground turkey, fish  Exercise 30 mins at least 5 times a week as tolerated
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
## 2019-10-18 NOTE — CASE MANAGEMENT
Treatment team met with Pt to discuss goals and treatment planning  Pt was cooperative, although he was tearful throughout meeting  Pt signed Tx Plan  ANGELA offered copy of treatment plan, Pt preferred to keep Tx plan in personal unit folder  ANGELA placed in Pt folder on unit

## 2019-10-18 NOTE — PLAN OF CARE
Problem: Depression  Goal: Treatment Goal: Demonstrate behavioral control of depressive symptoms, verbalize feelings of improved mood/affect, and adopt new coping skills prior to discharge  Outcome: Not Progressing  Goal: Verbalize thoughts and feelings  Description  Interventions:  - Assess and re-assess patient's level of risk   - Engage patient in 1:1 interactions, daily, for a minimum of 15 minutes   - Encourage patient to express feelings, fears, frustrations, hopes   Outcome: Not Progressing  Goal: Refrain from harming self  Description  Interventions:  - Monitor patient closely, per order   - Supervise medication ingestion, monitor effects and side effects   Outcome: Not Progressing  Goal: Refrain from isolation  Description  Interventions:  - Develop a trusting relationship   - Encourage socialization   Outcome: Not Progressing  Goal: Refrain from self-neglect  Outcome: Not Progressing     Problem: SUBSTANCE USE/ABUSE  Goal: Will have no detox symptoms and will verbalize plan for changing substance-related behavior  Description  INTERVENTIONS:  - Monitor physical status and assess for symptoms of withdrawal  - Administer medication as ordered  - Provide emotional support with 1 on 1 interaction with staff  - Encourage recovery focused program/ addiction education  - Assess for verbalization of changing behaviors related to substance abuse  - Initiate consults and referrals as appropriate (Case Management, Spiritual Care, etc )  Outcome: Not Progressing  Goal: By discharge, will develop insight into their chemical dependency and sustain motivation to continue in recovery  Description  INTERVENTIONS:  - Attends all daily group sessions and scheduled AA groups  - Actively practices coping skills through participation in the therapeutic community and adherence to program rules  - Reviews and completes assignments from individual treatment plan  - Assist patient development of understanding of their personal cycle of addiction and relapse triggers  Outcome: Not Progressing  Goal: By discharge, patient will have ongoing treatment plan addressing chemical dependency  Description  INTERVENTIONS:  - Assist patient with resources and/or appointments for ongoing recovery based living  Outcome: Not Progressing     Problem: Nutrition/Hydration-ADULT  Goal: Nutrient/Hydration intake appropriate for improving, restoring or maintaining nutritional needs  Description  Monitor and assess patient's nutrition/hydration status for malnutrition  Collaborate with interdisciplinary team and initiate plan and interventions as ordered  Monitor patient's weight and dietary intake as ordered or per policy  Utilize nutrition screening tool and intervene as necessary  Determine patient's food preferences and provide high-protein, high-caloric foods as appropriate       INTERVENTIONS:  - Monitor oral intake, urinary output, labs, and treatment plans  - Assess nutrition and hydration status and recommend course of action  - Evaluate amount of meals eaten  - Assist patient with eating if necessary   - Allow adequate time for meals  - Recommend/ encourage appropriate diets, oral nutritional supplements, and vitamin/mineral supplements  - Order, calculate, and assess calorie counts as needed  - Recommend, monitor, and adjust tube feedings and TPN/PPN based on assessed needs  - Assess need for intravenous fluids  - Provide specific nutrition/hydration education as appropriate  - Include patient/family/caregiver in decisions related to nutrition  Outcome: Not Progressing
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
## 2019-10-18 NOTE — PROGRESS NOTES
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  He is from [place]  He was stopped by a co-worker  He admits to drinking everyday whiskey and to even having drinks in the AM to settle his nerves  Pt has been  from his wife for 10 years  Recently she stated that she loved him and he thought they were getting back together  He found out that his wife was now involved with one of his friends  On admission he is complaining of withdrawals from alcohol  He is having tremors, chills and sweats, headache, and  seeing shadows  Medical was made aware of his symptoms and a one time dose of Ativan 2 mg was ordered and given  Shruti President NP was made aware that we could not do the continuous pulse ox as ordered  Pt stated that he was suicidal but would come to staff before acting on it  He also stated that he was homicidal thoughts about his wife's boyfriend  He denied auditory hallucinations  He stated that all his drug use was in the past but his UDS was positive for Meth

## 2019-10-18 NOTE — ASSESSMENT & PLAN NOTE
Patient cleared for admission  Would advise seizure precautions  We will now sign off please feel free to call if further assistance is needed form internal medicine

## 2019-10-18 NOTE — PROGRESS NOTES
Pt appears isolative and withdrawn, depressed and tearful at times  States he continues to have SI, but no immediate plan  Contracts for safety  Staff encouraged to keep close watch on patient  CIWAs positive for withdrawal features  Compliant with meds  Pt also appeared anxious, received prn Atarax for anxiety  Medication was mildly effective

## 2019-10-19 LAB
CHOLEST SERPL-MCNC: 165 MG/DL
HDLC SERPL-MCNC: 42 MG/DL (ref 40–59)
LDLC SERPL CALC-MCNC: 94 MG/DL
NONHDLC SERPL-MCNC: 123 MG/DL
TRIGL SERPL-MCNC: 146 MG/DL

## 2019-10-19 PROCEDURE — 80061 LIPID PANEL: CPT | Performed by: PSYCHIATRY & NEUROLOGY

## 2019-10-19 RX ORDER — NICOTINE 21 MG/24HR
1 PATCH, TRANSDERMAL 24 HOURS TRANSDERMAL DAILY
Status: DISCONTINUED | OUTPATIENT
Start: 2019-10-19 | End: 2019-10-29 | Stop reason: HOSPADM

## 2019-10-19 RX ORDER — DIVALPROEX SODIUM 500 MG/1
500 TABLET, DELAYED RELEASE ORAL EVERY 12 HOURS
Status: DISCONTINUED | OUTPATIENT
Start: 2019-10-19 | End: 2019-10-22

## 2019-10-19 RX ORDER — NICOTINE 21 MG/24HR
1 PATCH, TRANSDERMAL 24 HOURS TRANSDERMAL DAILY
Status: DISCONTINUED | OUTPATIENT
Start: 2019-10-20 | End: 2019-10-19

## 2019-10-19 RX ORDER — DIVALPROEX SODIUM 500 MG/1
500 TABLET, DELAYED RELEASE ORAL DAILY
Status: COMPLETED | OUTPATIENT
Start: 2019-10-19 | End: 2019-10-19

## 2019-10-19 RX ADMIN — LORAZEPAM 2 MG: 1 TABLET ORAL at 17:17

## 2019-10-19 RX ADMIN — LORAZEPAM 2 MG: 1 TABLET ORAL at 13:18

## 2019-10-19 RX ADMIN — HALOPERIDOL 5 MG: 5 TABLET ORAL at 19:32

## 2019-10-19 RX ADMIN — DIVALPROEX SODIUM 500 MG: 500 TABLET, DELAYED RELEASE ORAL at 21:11

## 2019-10-19 RX ADMIN — LORAZEPAM 2 MG: 1 TABLET ORAL at 09:01

## 2019-10-19 RX ADMIN — FOLIC ACID 1 MG: 1 TABLET ORAL at 09:00

## 2019-10-19 RX ADMIN — NICOTINE 1 PATCH: 21 PATCH, EXTENDED RELEASE TRANSDERMAL at 18:26

## 2019-10-19 RX ADMIN — DIVALPROEX SODIUM 500 MG: 500 TABLET, DELAYED RELEASE ORAL at 00:45

## 2019-10-19 RX ADMIN — LORAZEPAM 2 MG: 1 TABLET ORAL at 21:27

## 2019-10-19 RX ADMIN — ATORVASTATIN CALCIUM 40 MG: 40 TABLET, FILM COATED ORAL at 09:00

## 2019-10-19 RX ADMIN — DIVALPROEX SODIUM 500 MG: 500 TABLET, DELAYED RELEASE ORAL at 09:36

## 2019-10-19 RX ADMIN — BENZTROPINE MESYLATE 1 MG: 1 TABLET ORAL at 19:32

## 2019-10-19 RX ADMIN — THIAMINE HCL TAB 100 MG 100 MG: 100 TAB at 09:00

## 2019-10-19 RX ADMIN — Medication 1 TABLET: at 09:00

## 2019-10-19 NOTE — PROGRESS NOTES
Pt remains seclusive to his room, has been sleeping much of the day  Pt denies hallucinations but states lights are too bright and sounds are slightly harsh  CIWA score is 5  He states he feels a little more anxious than usual and feels depressed about many things but is scant and guarded with details  He has SI without a plan and contracts for safety on the unit  Pt has been cooperative

## 2019-10-19 NOTE — PROGRESS NOTES
Progress Note - Behavioral Health   Kristin Cherry 48 y o  male MRN: [id number]  Unit/Bed#: [facility]02 Encounter: @CSN        Assessment/Plan   Principal Problem:    Bipolar I disorder, most recent episode depressed, severe without psychotic features ([facility] [address] [address] )  Active Problems:    Post-traumatic stress disorder, chronic    Hyperlipidemia    Uncomplicated alcohol dependence ([facility] [address] [address] )    Cigarette nicotine dependence without complication    Medical clearance for psychiatric admission    Cannabis abuse, episodic    Learning disability    Methamphetamine abuse, episodic (HCC)      Subjective: The patient was seen today for continuing care and reviewed with treatment team   Saintclair Shows  today reports that he came to the hospital as he was feeling depressed and suicidal   Patient reported that his  for 10 years however he still has not taken the suppression well because his "wife is with my friend"  He works as a  in [place]  Prior to admission he had a "breakdown" was crying and feeling very sad about himself and "could not get my act together"  He states that he has been intermittently compliant with his medication  He says his depression is 7/10, in severity 10 being severely depressed  He denies any active suicidal/homicidal ideation intent or plan at this time  Denies any perceptual disturbances   Reports tolerating medication well  Seclusive to the room  Does not come to groups  Does not participate in milieu  Behavior over the last 24 hours: unchanged  Current Medications:    Current Facility-Administered Medications:  acetaminophen 650 mg Oral Q6H PRN LUTHER Ramirez   aluminum-magnesium hydroxide-simethicone 30 mL Oral Q6H PRN Lizeth Shani KAMILLA Linton DO   atorvastatin 40 mg Oral Daily LUTHER Murcia   benztropine 1 mg Intramuscular Q6H PRN Zahira Ayala MD   benztropine 1 mg Oral Q6H PRN Zahira Ayala MD   divalproex sodium 500 mg Oral Q12H Carolin Gaytan MD   folic acid 1 mg Oral Daily Leticia L Bendock, DO   haloperidol 5 mg Oral Q8H PRN Aubrey Johnson MD   haloperidol lactate 5 mg Intramuscular Q8H PRN Florencia Dinh MD   hydrOXYzine HCL 25 mg Oral Q6H PRN Florencia Dinh MD   hydrOXYzine HCL 50 mg Oral Q6H PRN Florencia Dinh MD   hydrOXYzine HCL 75 mg Oral Q6H PRN Florencia Dinh MD   ibuprofen 600 mg Oral Q6H PRN Florencia Dinh MD   ibuprofen 800 mg Oral Q8H PRN Florencia Dinh MD   LORazepam 1 mg Intramuscular Q6H PRN Florencia Dinh MD   LORazepam 1 mg Oral Q6H PRN Florencia Dinh MD   LORazepam 1 5 mg Oral Q6H PRN Florencia Dinh MD   LORazepam 2 mg Oral 4x Daily MD Nery Lyon ON 10/20/2019] LORazepam 2 mg Oral TID Florencia Dinh MD   LORazepam 2 mg Oral Q6H PRN Florencia Dinh MD   magnesium hydroxide 20 mL Oral Daily PRN Aubrey Johnson MD   multivitamin-minerals 1 tablet Oral Daily Leticia Linton, DO   OLANZapine 10 mg Oral Q3H PRBO Dinh MD   OLANZapine 10 mg Intramuscular Q3H PRBO Dinh MD   risperiDONE 1 mg Oral Q12H PRN Aubrey Johnson MD   thiamine 100 mg Oral Daily Leticia L Shaila, DO   traZODone 50 mg Oral HS PRN Florencia Dinh MD       Behavioral Health Medications: all current active meds have been reviewed and continue current psychiatric medications  Vital signs in last 24 hours:  Temp:  [97 8 °F (36 6 °C)-98 °F (36 7 °C)] 98 °F (36 7 °C)  HR:  [72-98] 98  Resp:  [16-18] 18  BP: ()/(58-83) 118/83    Laboratory results:  I have personally reviewed all pertinent laboratory/tests results      Psychiatric Review of Systems:  Behavior over the last 24 hours:  unchanged  Sleep: insomnia  Appetite: normal  Medication side effects: No  ROS: no complaints    Mental Status Evaluation:  Appearance:  disheveled and older than stated age   Behavior:   cooperative   Speech:  normal pitch and normal volume   Mood:  depressed   Affect:  mood-congruent   Thought Process:  logical Thought Content:  normal   Perceptual Disturbances: None   Risk Potential: Suicidal Ideations none, Homicidal Ideations none and Potential for Aggression No   Sensorium:  person, place, time/date and situation   Consciousness:  alert    Attention: attention span and concentration were age appropriate   Insight:  poor   Judgment: poor   Gait/Station: normal gait/station   Motor Activity: no abnormal movements       Progress Toward Goals: unchanged    Recommended Treatment: 1  Continue with group therapy, milieu therapy and occupational therapy  2 Continue following current medications:     Current Facility-Administered Medications:  acetaminophen 650 mg Oral Q6H PRN Yolonda Cheadle, CRNP   aluminum-magnesium hydroxide-simethicone 30 mL Oral Q6H PRN Justa Elise L Bendock, DO   atorvastatin 40 mg Oral Daily LUTHER Murcia   benztropine 1 mg Intramuscular Q6H PRN Therese Isaac MD   benztropine 1 mg Oral Q6H PRN Therese Isaac MD   divalproex sodium 500 mg Oral Q12H Chato Kohli MD   folic acid 1 mg Oral Daily Leticia KAMILLA Linton, DO   haloperidol 5 mg Oral Q8H PRN Catalina Hinojosa MD   haloperidol lactate 5 mg Intramuscular Q8H PRN Therese Isaac MD   hydrOXYzine HCL 25 mg Oral Q6H PRN Therese Isaac MD   hydrOXYzine HCL 50 mg Oral Q6H PRN Therese Isaac MD   hydrOXYzine HCL 75 mg Oral Q6H PRN Therese Isaac MD   ibuprofen 600 mg Oral Q6H PRN Therese Isaac MD   ibuprofen 800 mg Oral Q8H PRN Therese Isaac MD   LORazepam 1 mg Intramuscular Q6H PRN Therese Isaac MD   LORazepam 1 mg Oral Q6H PRN Therese Isaac MD   LORazepam 1 5 mg Oral Q6H PRN Therese Isaac MD   LORazepam 2 mg Oral 4x Daily MD Karma Hayden ON 10/20/2019] LORazepam 2 mg Oral TID Therese Isaac MD   LORazepam 2 mg Oral Q6H PRN Therese Isaac MD   magnesium hydroxide 20 mL Oral Daily PRN Catalina Hinojosa MD   multivitamin-minerals 1 tablet Oral Daily Leticia L Bendock, DO   OLANZapine 10 mg Oral Q3H PRN Aravind Mejia MD   OLANZapine 10 mg Intramuscular Q3H PRN Aravind Mejia MD   risperiDONE 1 mg Oral Q12H PRN Elijah Richard MD   thiamine 100 mg Oral Daily Leticia Linton DO   traZODone 50 mg Oral HS PRN Aravind Mejia MD       Risks, benefits and possible side effects of Medications:   Risks, benefits, and possible side effects of medications explained to patient and patient verbalizes understanding  This note has been constructed using a voice recognition system  There may be translation, syntax,  or grammatical errors  If you have any questions, please contact the dictating provider

## 2019-10-19 NOTE — PROGRESS NOTES
Pt is compliant with medications  He appears flat and is very scant, guarded  He states he came into the hospital for "mental health issues "  He states he has SI but did not have a plan and was guarded, states he did not want to talk about stressors that contributed  He agreed that he has some financial and family issues but would not go into detail  Pt denied HI or hallucinations

## 2019-10-19 NOTE — PLAN OF CARE
Problem: Depression  Goal: Refrain from harming self  Description  Interventions:  - Monitor patient closely, per order   - Supervise medication ingestion, monitor effects and side effects   Outcome: Progressing  Goal: Refrain from self-neglect  Outcome: Progressing     Problem: SUBSTANCE USE/ABUSE  Goal: By discharge, will develop insight into their chemical dependency and sustain motivation to continue in recovery  Description  INTERVENTIONS:  - Attends all daily group sessions and scheduled AA groups  - Actively practices coping skills through participation in the therapeutic community and adherence to program rules  - Reviews and completes assignments from individual treatment plan  - Assist patient development of understanding of their personal cycle of addiction and relapse triggers  Outcome: Progressing  Goal: By discharge, patient will have ongoing treatment plan addressing chemical dependency  Description  INTERVENTIONS:  - Assist patient with resources and/or appointments for ongoing recovery based living  Outcome: Progressing     Problem: Nutrition/Hydration-ADULT  Goal: Nutrient/Hydration intake appropriate for improving, restoring or maintaining nutritional needs  Description  Monitor and assess patient's nutrition/hydration status for malnutrition  Collaborate with interdisciplinary team and initiate plan and interventions as ordered  Monitor patient's weight and dietary intake as ordered or per policy  Utilize nutrition screening tool and intervene as necessary  Determine patient's food preferences and provide high-protein, high-caloric foods as appropriate       INTERVENTIONS:  - Monitor oral intake, urinary output, labs, and treatment plans  - Assess nutrition and hydration status and recommend course of action  - Evaluate amount of meals eaten  - Assist patient with eating if necessary   - Allow adequate time for meals  - Recommend/ encourage appropriate diets, oral nutritional supplements, and vitamin/mineral supplements  - Order, calculate, and assess calorie counts as needed  - Recommend, monitor, and adjust tube feedings and TPN/PPN based on assessed needs  - Assess need for intravenous fluids  - Provide specific nutrition/hydration education as appropriate  - Include patient/family/caregiver in decisions related to nutrition  Outcome: Progressing     Problem: Depression  Goal: Treatment Goal: Demonstrate behavioral control of depressive symptoms, verbalize feelings of improved mood/affect, and adopt new coping skills prior to discharge  Outcome: Not Progressing  Goal: Verbalize thoughts and feelings  Description  Interventions:  - Assess and re-assess patient's level of risk   - Engage patient in 1:1 interactions, daily, for a minimum of 15 minutes   - Encourage patient to express feelings, fears, frustrations, hopes   Outcome: Not Progressing  Goal: Refrain from isolation  Description  Interventions:  - Develop a trusting relationship   - Encourage socialization   Outcome: Not Progressing     Problem: SUBSTANCE USE/ABUSE  Goal: Will have no detox symptoms and will verbalize plan for changing substance-related behavior  Description  INTERVENTIONS:  - Monitor physical status and assess for symptoms of withdrawal  - Administer medication as ordered  - Provide emotional support with 1 on 1 interaction with staff  - Encourage recovery focused program/ addiction education  - Assess for verbalization of changing behaviors related to substance abuse  - Initiate consults and referrals as appropriate (Case Management, Spiritual Care, etc )  Outcome: Not Progressing

## 2019-10-20 RX ADMIN — DIVALPROEX SODIUM 500 MG: 500 TABLET, DELAYED RELEASE ORAL at 21:15

## 2019-10-20 RX ADMIN — ATORVASTATIN CALCIUM 40 MG: 40 TABLET, FILM COATED ORAL at 08:58

## 2019-10-20 RX ADMIN — LORAZEPAM 2 MG: 1 TABLET ORAL at 21:15

## 2019-10-20 RX ADMIN — FOLIC ACID 1 MG: 1 TABLET ORAL at 08:59

## 2019-10-20 RX ADMIN — LORAZEPAM 2 MG: 1 TABLET ORAL at 08:58

## 2019-10-20 RX ADMIN — NICOTINE 1 PATCH: 21 PATCH, EXTENDED RELEASE TRANSDERMAL at 08:59

## 2019-10-20 RX ADMIN — Medication 1 TABLET: at 08:58

## 2019-10-20 RX ADMIN — LORAZEPAM 2 MG: 1 TABLET ORAL at 17:12

## 2019-10-20 RX ADMIN — DIVALPROEX SODIUM 500 MG: 500 TABLET, DELAYED RELEASE ORAL at 08:59

## 2019-10-20 RX ADMIN — THIAMINE HCL TAB 100 MG 100 MG: 100 TAB at 08:59

## 2019-10-20 NOTE — PROGRESS NOTES
Pt asked to speak to his nurse and was demanding to leave right now because he just spoke to his son and this made him more depressed  Diane Henry was crying and still demanding to leave  He then stated " I found out that my wife has been with my best friend for the past 10 years and I cannot believe this  I am such an idiot "  Diane Henry then started to talk about the two times in his life that he has attempted to hang himself and kept using the word " hanging"  He stated he was " thinking more about why I am alive  My kids are grown and    My wife is laughing at me and I have no one but [place] and I cant even have my bottle of bourban now to comfort  "  He was again demanding to leave and ask if there was anything he could sign to leave  839 he notice given, reviewed and signed  Diane Henry then asked for medication to help calm him down because  " I feel I am getting agitated  "  PRN Negra and Cogentin administered which was effective as Diane Henry is calmer at present   Diane Henry was placed on 1:1 at nurses discretion

## 2019-10-20 NOTE — PLAN OF CARE
Problem: DISCHARGE PLANNING  Goal: Discharge to home or other facility with appropriate resources  Description  INTERVENTIONS:  - Identify barriers to discharge w/patient and caregiver  - Arrange for needed discharge resources and transportation as appropriate  - Identify discharge learning needs (meds, wound care, etc )  - Refer to Case Management Department for coordinating discharge planning if the patient needs post-hospital services based on physician/advanced practitioner order or complex needs related to functional status, cognitive ability, or social support system   Outcome: Progressing     Problem: Depression  Goal: Treatment Goal: Demonstrate behavioral control of depressive symptoms, verbalize feelings of improved mood/affect, and adopt new coping skills prior to discharge  Outcome: Not Progressing  Goal: Verbalize thoughts and feelings  Description  Interventions:  - Assess and re-assess patient's level of risk   - Engage patient in 1:1 interactions, daily, for a minimum of 15 minutes   - Encourage patient to express feelings, fears, frustrations, hopes   Outcome: Not Progressing  Goal: Refrain from harming self  Description  Interventions:  - Monitor patient closely, per order   - Supervise medication ingestion, monitor effects and side effects   Outcome: Not Progressing  Goal: Refrain from isolation  Description  Interventions:  - Develop a trusting relationship   - Encourage socialization   Outcome: Not Progressing  Goal: Refrain from self-neglect  Outcome: Not Progressing     Problem: SUBSTANCE USE/ABUSE  Goal: Will have no detox symptoms and will verbalize plan for changing substance-related behavior  Description  INTERVENTIONS:  - Monitor physical status and assess for symptoms of withdrawal  - Administer medication as ordered  - Provide emotional support with 1 on 1 interaction with staff  - Encourage recovery focused program/ addiction education  - Assess for verbalization of changing behaviors related to substance abuse  - Initiate consults and referrals as appropriate (Case Management, Spiritual Care, etc )  Outcome: Not Progressing  Goal: By discharge, will develop insight into their chemical dependency and sustain motivation to continue in recovery  Description  INTERVENTIONS:  - Attends all daily group sessions and scheduled AA groups  - Actively practices coping skills through participation in the therapeutic community and adherence to program rules  - Reviews and completes assignments from individual treatment plan  - Assist patient development of understanding of their personal cycle of addiction and relapse triggers  Outcome: Not Progressing  Goal: By discharge, patient will have ongoing treatment plan addressing chemical dependency  Description  INTERVENTIONS:  - Assist patient with resources and/or appointments for ongoing recovery based living  Outcome: Not Progressing     Problem: Nutrition/Hydration-ADULT  Goal: Nutrient/Hydration intake appropriate for improving, restoring or maintaining nutritional needs  Description  Monitor and assess patient's nutrition/hydration status for malnutrition  Collaborate with interdisciplinary team and initiate plan and interventions as ordered  Monitor patient's weight and dietary intake as ordered or per policy  Utilize nutrition screening tool and intervene as necessary  Determine patient's food preferences and provide high-protein, high-caloric foods as appropriate       INTERVENTIONS:  - Monitor oral intake, urinary output, labs, and treatment plans  - Assess nutrition and hydration status and recommend course of action  - Evaluate amount of meals eaten  - Assist patient with eating if necessary   - Allow adequate time for meals  - Recommend/ encourage appropriate diets, oral nutritional supplements, and vitamin/mineral supplements  - Order, calculate, and assess calorie counts as needed  - Recommend, monitor, and adjust tube feedings and TPN/PPN based on assessed needs  - Assess need for intravenous fluids  - Provide specific nutrition/hydration education as appropriate  - Include patient/family/caregiver in decisions related to nutrition  Outcome: Not Progressing

## 2019-10-20 NOTE — PROGRESS NOTES
Progress Note - Behavioral Health   Ashley Chaves 48 y o  male MRN: [id number]  Unit/Bed#: [facility] 352-01 Encounter: @CSN        Assessment/Plan   Principal Problem:    Bipolar I disorder, most recent episode depressed, severe without psychotic features ([facility] )  Active Problems:    Post-traumatic stress disorder, chronic    Hyperlipidemia    Uncomplicated alcohol dependence ([facility] )    Cigarette nicotine dependence without complication    Medical clearance for psychiatric admission    Cannabis abuse, episodic    Learning disability    Methamphetamine abuse, episodic (HCC)      Subjective: The patient was seen today for continuing care and reviewed with treatment team   Blaine Delcid has been on 1:1 as he became more depressed and suicidal after talking to his son yesterday evening  Son reported total also ex-wife, has been with his friend longer than he anticipated which kind of further demoralized him, as he was hoping to get back together  Blaine Delcid  today reports that he is still trying to get his act together  He he has nothing to look forward in life  He will take his life within 3 hours of being discharged, but does not explain how  He does not have any suicidal plan while in the unit  He reports being concerned about his mother, sister, and employer, because they do not know his whereabouts  Patient reports though he spoke to his son yesterday" he does not talk to any of these people so they will not know'  He denies any perceptual disturbances  Discussed with patient and explained to him to verbalize his thoughts to staff in the unit  He was able to contract for safety  Does not interact with peers  Seclusive to the room  Does not participate in milieu  Behavior over the last 24 hours: regressed  Current Medications:    Current Facility-Administered Medications:  acetaminophen 650 mg Oral Q6H PRN LUTHER Chavez   atorvastatin 40 mg Oral Daily LUTHER Murcia   benztropine 1 mg Intramuscular Q6H PRN Yarelizeannabela MD Reese   benztropine 1 mg Oral Q6H PRN Yarelizejayleen Leigh MD   divalproex sodium 500 mg Oral Q12H José Antonio Currie MD   folic acid 1 mg Oral Daily Leticia Linton,    haloperidol 5 mg Oral Q8H PRN Nupur Mtz MD   haloperidol lactate 5 mg Intramuscular Q8H PRN Yarelizeannabela Reese, MD   hydrOXYzine HCL 25 mg Oral Q6H PRN Rosezetta All, MD   hydrOXYzine HCL 50 mg Oral Q6H PRN Rosezetta All, MD   hydrOXYzine HCL 75 mg Oral Q6H PRN Yarelizetta All, MD   ibuprofen 600 mg Oral Q6H PRN Rosezetta All, MD   ibuprofen 800 mg Oral Q8H PRN Yarelizetta Reese, MD   LORazepam 1 mg Intramuscular Q6H PRN Yarelizetta MD Reese   LORazepam 1 mg Oral Q6H PRN Yarelizetta MD Reese   LORazepam 1 5 mg Oral Q6H PRN Yarelizetta All, MD   LORazepam 2 mg Oral TID Yarelizeannabela Reese, MD   LORazepam 2 mg Oral Q6H PRN Rosezetta Reese, MD   magnesium hydroxide 20 mL Oral Daily PRN Nupur Mtz MD   multivitamin-minerals 1 tablet Oral Daily Amy Linton,    nicotine 1 patch Transdermal Daily José Antonio Currie MD   OLANZapine 10 mg Oral Q3H PRN Yarelizejayleen Leigh MD   OLANZapine 10 mg Intramuscular Q3H PRN Yarelizejayleen Leigh MD   risperiDONE 1 mg Oral Q12H PRN Nupur Mtz MD   thiamine 100 mg Oral Daily Leticia Linton,    traZODone 50 mg Oral HS PRN Yarelizejayleen Leigh MD       Behavioral Health Medications: all current active meds have been reviewed and continue current psychiatric medications  Vital signs in last 24 hours:  Temp:  [97 6 °F (36 4 °C)-98 3 °F (36 8 °C)] 97 6 °F (36 4 °C)  HR:  [66-92] 70  Resp:  [16] 16  BP: (112-122)/(69-88) 112/69    Laboratory results:  I have personally reviewed all pertinent laboratory/tests results    Lipid profile on 10/19/2019- grossly normal  Psychiatric Review of Systems:  Behavior over the last 24 hours:  regressed  Sleep: normal  Appetite: normal  Medication side effects: No  ROS: no complaints    Mental Status Evaluation:  Appearance:  casually dressed, disheveled and older than stated age   Behavior:   cooperative   Speech:  soft   Mood:  sad   Affect:  increased in intensity, increased in range and mood-congruent   Thought Process:  logical   Thought Content:  no delusions reported   Perceptual Disturbances: None   Risk Potential: Reports suicidal ideation without plan  No homicidal ideation intent or plan   Sensorium:  person, place and time/date   Consciousness:  alert and awake    Insight:   poor   Judgment: impaired   Gait/Station: normal gait/station   Motor Activity: no abnormal movements       Progress Toward Goals:  More depressed, hopeless  Suicidal thoughts without plan  Continue 1:1 for observation    Recommended Treatment: 1  Continue with group therapy, milieu therapy and occupational therapy  2 Continue following current medications:     Current Facility-Administered Medications:  acetaminophen 650 mg Oral Q6H PRN Kevin Sicard, CRNP   atorvastatin 40 mg Oral Daily LUTHER Murcia   benztropine 1 mg Intramuscular Q6H PRN Danii Espino MD   benztropine 1 mg Oral Q6H PRN Danii Espino MD   divalproex sodium 500 mg Oral Q12H Sylvie Salazar MD   folic acid 1 mg Oral Daily Leticia Linton DO   haloperidol 5 mg Oral Q8H PRN Adriana Child MD   haloperidol lactate 5 mg Intramuscular Q8H PRN Danii Espino MD   hydrOXYzine HCL 25 mg Oral Q6H PRN Danii Espino MD   hydrOXYzine HCL 50 mg Oral Q6H PRN Danii Espino MD   hydrOXYzine HCL 75 mg Oral Q6H PRN Danii Espino MD   ibuprofen 600 mg Oral Q6H PRN Danii Espino MD   ibuprofen 800 mg Oral Q8H PRN Danii Espino MD   LORazepam 1 mg Intramuscular Q6H PRN Danii Espino MD   LORazepam 1 mg Oral Q6H PRN Danii Espino MD   LORazepam 1 5 mg Oral Q6H PRN Danii Espino MD   LORazepam 2 mg Oral TID Danii Espino MD   LORazepam 2 mg Oral Q6H PRN Danii Espino MD   magnesium hydroxide 20 mL Oral Daily PRN Karen Mart MD   multivitamin-minerals 1 tablet Oral Daily Ilia Ku DO   nicotine 1 patch Transdermal Daily Rosemary Barrett MD   OLANZapine 10 mg Oral Q3H PRN Minerva Castaneda MD   OLANZapine 10 mg Intramuscular Q3H PRN Minerva Castaneda MD   risperiDONE 1 mg Oral Q12H PRN Karen Mart MD   thiamine 100 mg Oral Daily Leticia Linton DO   traZODone 50 mg Oral HS PRN Minerva Castaneda MD       Risks, benefits and possible side effects of Medications:   Risks, benefits, and possible side effects of medications explained to patient and patient verbalizes understanding  This note has been constructed using a voice recognition system  There may be translation, syntax,  or grammatical errors  If you have any questions, please contact the dictating provider

## 2019-10-20 NOTE — PROGRESS NOTES
Patient in bed, fetal position with blankets over his head  Patient states he is still suicidal and expresses hopelessness  Patient affect is depressed/sad  Encouraged patient to come out on unit and eat

## 2019-10-21 PROBLEM — F31.5 BIPOLAR I DISORDER, MOST RECENT EPISODE DEPRESSED, SEVERE WITH PSYCHOTIC FEATURES (HCC): Chronic | Status: ACTIVE | Noted: 2017-10-30

## 2019-10-21 PROCEDURE — 99232 SBSQ HOSP IP/OBS MODERATE 35: CPT | Performed by: PSYCHIATRY & NEUROLOGY

## 2019-10-21 RX ORDER — QUETIAPINE FUMARATE 100 MG/1
100 TABLET, FILM COATED ORAL
Status: DISCONTINUED | OUTPATIENT
Start: 2019-10-21 | End: 2019-10-22

## 2019-10-21 RX ORDER — MIRTAZAPINE 15 MG/1
15 TABLET, FILM COATED ORAL
Status: DISCONTINUED | OUTPATIENT
Start: 2019-10-21 | End: 2019-10-29 | Stop reason: HOSPADM

## 2019-10-21 RX ADMIN — LORAZEPAM 1.5 MG: 1 TABLET ORAL at 21:27

## 2019-10-21 RX ADMIN — HYDROXYZINE HYDROCHLORIDE 75 MG: 25 TABLET ORAL at 11:53

## 2019-10-21 RX ADMIN — LORAZEPAM 2 MG: 1 TABLET ORAL at 08:23

## 2019-10-21 RX ADMIN — DIVALPROEX SODIUM 500 MG: 500 TABLET, DELAYED RELEASE ORAL at 21:27

## 2019-10-21 RX ADMIN — NICOTINE 1 PATCH: 21 PATCH, EXTENDED RELEASE TRANSDERMAL at 10:55

## 2019-10-21 RX ADMIN — ATORVASTATIN CALCIUM 40 MG: 40 TABLET, FILM COATED ORAL at 08:24

## 2019-10-21 RX ADMIN — MIRTAZAPINE 15 MG: 15 TABLET, FILM COATED ORAL at 21:27

## 2019-10-21 RX ADMIN — Medication 1 TABLET: at 08:24

## 2019-10-21 RX ADMIN — LORAZEPAM 1.5 MG: 1 TABLET ORAL at 16:50

## 2019-10-21 RX ADMIN — FOLIC ACID 1 MG: 1 TABLET ORAL at 08:27

## 2019-10-21 RX ADMIN — DIVALPROEX SODIUM 500 MG: 500 TABLET, DELAYED RELEASE ORAL at 08:24

## 2019-10-21 RX ADMIN — QUETIAPINE FUMARATE 100 MG: 100 TABLET ORAL at 21:27

## 2019-10-21 RX ADMIN — THIAMINE HCL TAB 100 MG 100 MG: 100 TAB at 08:24

## 2019-10-21 NOTE — PLAN OF CARE
Problem: Depression  Goal: Verbalize thoughts and feelings  Description  Interventions:  - Assess and re-assess patient's level of risk   - Engage patient in 1:1 interactions, daily, for a minimum of 15 minutes   - Encourage patient to express feelings, fears, frustrations, hopes   Outcome: Progressing  Goal: Refrain from harming self  Description  Interventions:  - Monitor patient closely, per order   - Supervise medication ingestion, monitor effects and side effects   Outcome: Progressing  Goal: Refrain from self-neglect  Outcome: Progressing     Problem: SUBSTANCE USE/ABUSE  Goal: Will have no detox symptoms and will verbalize plan for changing substance-related behavior  Description  INTERVENTIONS:  - Monitor physical status and assess for symptoms of withdrawal  - Administer medication as ordered  - Provide emotional support with 1 on 1 interaction with staff  - Encourage recovery focused program/ addiction education  - Assess for verbalization of changing behaviors related to substance abuse  - Initiate consults and referrals as appropriate (Case Management, Spiritual Care, etc )  Outcome: Progressing  Goal: By discharge, will develop insight into their chemical dependency and sustain motivation to continue in recovery  Description  INTERVENTIONS:  - Attends all daily group sessions and scheduled AA groups  - Actively practices coping skills through participation in the therapeutic community and adherence to program rules  - Reviews and completes assignments from individual treatment plan  - Assist patient development of understanding of their personal cycle of addiction and relapse triggers  Outcome: Progressing  Goal: By discharge, patient will have ongoing treatment plan addressing chemical dependency  Description  INTERVENTIONS:  - Assist patient with resources and/or appointments for ongoing recovery based living  Outcome: Progressing     Problem: Nutrition/Hydration-ADULT  Goal: Nutrient/Hydration intake appropriate for improving, restoring or maintaining nutritional needs  Description  Monitor and assess patient's nutrition/hydration status for malnutrition  Collaborate with interdisciplinary team and initiate plan and interventions as ordered  Monitor patient's weight and dietary intake as ordered or per policy  Utilize nutrition screening tool and intervene as necessary  Determine patient's food preferences and provide high-protein, high-caloric foods as appropriate       INTERVENTIONS:  - Monitor oral intake, urinary output, labs, and treatment plans  - Assess nutrition and hydration status and recommend course of action  - Evaluate amount of meals eaten  - Assist patient with eating if necessary   - Allow adequate time for meals  - Recommend/ encourage appropriate diets, oral nutritional supplements, and vitamin/mineral supplements  - Order, calculate, and assess calorie counts as needed  - Recommend, monitor, and adjust tube feedings and TPN/PPN based on assessed needs  - Assess need for intravenous fluids  - Provide specific nutrition/hydration education as appropriate  - Include patient/family/caregiver in decisions related to nutrition  Outcome: Progressing     Problem: Depression  Goal: Treatment Goal: Demonstrate behavioral control of depressive symptoms, verbalize feelings of improved mood/affect, and adopt new coping skills prior to discharge  Outcome: Not Progressing  Goal: Refrain from isolation  Description  Interventions:  - Develop a trusting relationship   - Encourage socialization   Outcome: Not Progressing

## 2019-10-21 NOTE — PROGRESS NOTES
10/21/19 0700   Team Meeting   Meeting Type Daily Rounds   Team Members Present   Team Members Present Physician;Nurse;;; Other (Discipline and Name)   Physician Team Member [address] Team Member [address] Management Team Member [place]   Social Work Team Member Conception Money   Other (Discipline and Name) CRISTIAN Vyas; Larissa Bonilla- student; Aries Church- PharmD; Akira Fontanez- Student; Imelda Case- Coordinator   Patient/Family Present   Patient Present No   Patient's Family Present No     Request pt rescind 72-hour notice

## 2019-10-21 NOTE — PROGRESS NOTES
[facility] NOTE     10/21/19 1000   Activity/Group Checklist   Group Admission/Discharge  (Completed Admission Self Assessment/DERs)   Attendance Attended   Attendance Duration (min) 0-15

## 2019-10-21 NOTE — PLAN OF CARE
Pt signed MARY for daughter, Perla Wallis will contact  Pt verbalizes hopelessness and continues to be tearful  Continue observation, medication management, and discharge planning
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
## 2019-10-21 NOTE — PROGRESS NOTES
Progress Note - Behavioral Health     [employer] 48 y o  male MRN: [id number]   Unit/Bed#: [facility] 352-01 Encounter: [id number]    Behavior over the last 24 hours: unchanged  Alexandra Marilin continues to feel depressed  He denies active suicidal thoughts at this time and contracts for safety on the unit, but is still very hopeless and helpless "I want to find my peace" He told staff and covering physician over the weekend, that he would kill himself within 3 hours of discharge  Seclusive to the room  Limited participation in milieu  No significant alcohol withdrawal symptoms  Sleep: decreased, slept 2 hours  Appetite: normal  Medication side effects: No   ROS: no complaints, denies any shortness of breath, chest pain or abdominal pain    Mental Status Evaluation:    Appearance:  disheveled, dressed in hospital attire   Behavior:  cooperative, no eye contact, psychomotor retardation   Speech:  scant, soft   Mood:  depressed, anxious   Affect:  blunted   Thought Process:  organized, goal directed   Associations: intact associations   Thought Content:  paranoid ideation   Perceptual Disturbances: auditory hallucinations of "mumbling", visual hallucinations of "waves"   Risk Potential: Suicidal ideation - None at present, but very hopeless; contracts for safety on the unit  Homicidal ideation - None  Potential for aggression - No   Sensorium:  oriented to person, place and time/date   Memory:  recent and remote memory grossly intact   Consciousness:  alert and awake   Attention: decreased concentration and decreased attention span   Insight:  impaired   Judgment: impaired   Gait/Station: normal gait/station, normal balance   Motor Activity: no abnormal movements     Vital signs in last 24 hours:    Temp:  [97 9 °F (36 6 °C)-98 5 °F (36 9 °C)] 98 5 °F (36 9 °C)  HR:  [69-84] 84  Resp:  [16] 16  BP: ()/(59-85) 112/85    Laboratory results: I have personally reviewed all pertinent laboratory/tests results       Lipid Profile:   Lab Results   Component Value Date    CHOLESTEROL 165 10/19/2019    HDL 42 10/19/2019    TRIG 146 10/19/2019    [address] 94 10/19/2019    [place] 123 10/19/2019       Suicide/Homicide Risk Assessment:    Risk of Harm to Self:   Based on today's assessment, Sidney Tran presents the following risk of harm to self: medium    Risk of Harm to Others:  Based on today's assessment, Sidney Tran presents the following risk of harm to others: none    The following interventions are recommended: continued hospitalization on locked unit, continual observation visual     Progress Toward Goals: no significant progress, still very depressed and hopeless, poor motivation  Contracts for safety on the inpatient unit  Assessment/Plan   Principal Problem:    Bipolar I disorder, most recent episode depressed, severe with psychotic features ([facility] 75 )  Active Problems:    Post-traumatic stress disorder, chronic    Uncomplicated alcohol dependence ([facility] )    Cannabis abuse, episodic    Learning disability    Methamphetamine abuse, episodic (HCC)    Hyperlipidemia    Cigarette nicotine dependence without complication    Medical clearance for psychiatric admission    Recommended Treatment:     Planned medication and treatment changes: All current active medications have been reviewed  Encourage group therapy, milieu therapy and occupational therapy  Continue eye watch for safety  Start Remeron 15 mg at bedtime to help with depressive symptoms  Titrate Seroquel  Decrease Ativan to 1 5 mg tid and taper off gradually  Check Depakote level, CBC/diff and CMP in the morning     Continue all other medications:    Current Facility-Administered Medications:  acetaminophen 650 mg Oral Q6H PRN LUTHER Marcial   atorvastatin 40 mg Oral Daily LUTHER Murcia   benztropine 1 mg Intramuscular Q6H PRN Zara Puentes MD   benztropine 1 mg Oral Q6H PRN Zara Puentes MD   divalproex sodium 500 mg Oral Q12H Xander Fernandez MD   folic acid 1 mg Oral Daily Leticia Linton, DO   haloperidol 5 mg Oral Q8H PRN Milly Watson MD   haloperidol lactate 5 mg Intramuscular Q8H PRN Baileyss Oatony, MD   hydrOXYzine HCL 25 mg Oral Q6H PRN Marliss Oar, MD   hydrOXYzine HCL 50 mg Oral Q6H PRN Marliss Oar, MD   hydrOXYzine HCL 75 mg Oral Q6H PRN Baileyss Oar, MD   ibuprofen 600 mg Oral Q6H PRN Marliss Oar, MD   ibuprofen 800 mg Oral Q8H PRN Marliss Oar, MD   LORazepam 1 mg Intramuscular Q6H PRN Marliss Oar, MD   LORazepam 1 mg Oral Q6H PRN Marliss Oar, MD   LORazepam 1 5 mg Oral Q6H PRN Marliss Oar, MD   LORazepam 2 mg Oral TID Marliss Oar, MD   LORazepam 2 mg Oral Q6H PRN Marliss Oar, MD   magnesium hydroxide 20 mL Oral Daily PRN Milly Watson MD   mirtazapine 15 mg Oral HS Marliss Oar, MD   multivitamin-minerals 1 tablet Oral Daily Mehdi Linton,    nicotine 1 patch Transdermal Daily Jameel Downs MD   OLANZapine 10 mg Oral Q3H PRN Baileyss Oar, MD   OLANZapine 10 mg Intramuscular Q3H PRN Baileyss OaMD tony   QUEtiapine 100 mg Oral HS Marmanishss OaMD tony   risperiDONE 1 mg Oral Q12H PRN Milly Watson MD   thiamine 100 mg Oral Daily Leticia Linton, DO   traZODone 50 mg Oral HS PRN Ilana Bang MD       Risks / Benefits of Treatment:    Risks, benefits, and possible side effects of medications explained to patient  Patient has limited understanding of risks and benefits of treatment at this time, but agrees to take medications as prescribed  Counseling / Coordination of Care:    Patient's progress discussed with staff in treatment team meeting  Medications, treatment progress and treatment plan reviewed with patient  Medication changes discussed with patient      Elizabeth Samayoa MD 10/21/19

## 2019-10-21 NOTE — PROGRESS NOTES
Pt is cooperative  He remains very depressed, anxious, feels hopeless and helpless  He denies SI    He remains on constant visual observation, compliant with medications
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
## 2019-10-21 NOTE — QUICK NOTE
Progress Note - Behavioral Health   Albaro Conley 48 y o  male MRN: [id number]  Unit/Bed#: [facility] 352-01 Encounter: [id number]    Assessment/Plan   Principal Problem:    Bipolar I disorder, most recent episode depressed, severe without psychotic features ([facility] 75 )  Active Problems:    Post-traumatic stress disorder, chronic    Hyperlipidemia    Uncomplicated alcohol dependence ([facility] 75 )    Cigarette nicotine dependence without complication    Medical clearance for psychiatric admission    Cannabis abuse, episodic    Learning disability    Methamphetamine abuse, episodic ([facility] 75 )      Behavior over the last 24 hours:  unchanged  Sleep: insomnia  Appetite: normal  Medication side effects: No  ROS: no complaints    Mental Status Evaluation:  Appearance:  casually dressed and older than stated age   Behavior:  guarded and cooperative   Poor eye-contact   Speech:  soft and scant   Mood:  anxious, depressed and hopeless   Affect:  blunted, flat and mood-congruent   Thought Process:  circumstantial and logical   Thought Content:  no delusions or obessions elicited   Perceptual Disturbances: admits to some auditory hallucinations without command ("mumbling") and visual hallucination of "wavy lines"   Risk Potential: Denies suicidal ideation, Homicidal Ideations none and Potential for Aggression No   Sensorium:  person, place, time/date, situation, day of week, month of year and year   Cognition:  grossly intact   Consciousness:  alert and awake    Attention: Attention span less than expected for age   Insight:  impaired   Judgment: impaired   Gait/Station: normal gait/station   Motor Activity: no abnormal movements       Vitals:    10/20/19 2125 10/21/19 0230 10/21/19 0748 10/21/19 1110   BP: 119/75 104/71 94/59 112/85   BP Location: Right arm Left arm Left arm Left arm   Pulse: 83 69 71 84   Resp:  16 16 16   Temp:  97 9 °F (36 6 °C) 98 5 °F (36 9 °C)    TempSrc:  Temporal Temporal    SpO2:  97%     Weight:       Height: Progress Toward Goals: Pt has made slight to no progress toward goals  Currently, pt denies suicidal ideation, but appears very depressed with flat affect on interview  Pt stated yesterday that he would kill himself within 3hrs of leaving the hospital  Pt denies homicidal ideation, but admits to auditory hallucination : hearing "mumbling,"and visual hallucinations in the form of "wavy lines " Pt states that is depression is 4/10 today (10 being the most depressed he's ever felt) with some persisting anxiety and paranoia  When pt was questioned regarding ROS he stated that "nothing hurts, just his heart " Pt continues to have poor sleep and normal appetite  Pt agrees to withdraw 72hr notice and continue treatment  Pt is compliant with medications with no side effects currently  Limited involvement in group therapy and in the milieu  Recommended Treatment: Continue with group therapy, milieu therapy and occupational therapy  Q7min checks for behavioral health, vitals daily  1:1 visual suicide precautions as patient is still high risk  Case management for outpatient psychiatric treatment and drug +alcohol rehab placement after discharge    Risks, benefits and possible side effects of Medications:   Risks, benefits, and possible side effects of medications explained to patient and patient verbalizes understanding        Medications:   all current active meds have been reviewed, continue current psychiatric medications, current meds:   Current Facility-Administered Medications   Medication Dose Route Frequency    acetaminophen (TYLENOL) tablet 650 mg  650 mg Oral Q6H PRN    atorvastatin (LIPITOR) tablet 40 mg  40 mg Oral Daily    benztropine (COGENTIN) injection 1 mg  1 mg Intramuscular Q6H PRN    benztropine (COGENTIN) tablet 1 mg  1 mg Oral Q6H PRN    divalproex sodium (DEPAKOTE) EC tablet 500 mg  500 mg Oral M07G    folic acid (FOLVITE) tablet 1 mg  1 mg Oral Daily    haloperidol (HALDOL) tablet 5 mg  5 mg Oral Q8H PRN    haloperidol lactate (HALDOL) injection 5 mg  5 mg Intramuscular Q8H PRN    hydrOXYzine HCL (ATARAX) tablet 25 mg  25 mg Oral Q6H PRN    hydrOXYzine HCL (ATARAX) tablet 50 mg  50 mg Oral Q6H PRN    hydrOXYzine HCL (ATARAX) tablet 75 mg  75 mg Oral Q6H PRN    ibuprofen (MOTRIN) tablet 600 mg  600 mg Oral Q6H PRN    ibuprofen (MOTRIN) tablet 800 mg  800 mg Oral Q8H PRN    LORazepam (ATIVAN) 2 mg/mL injection 1 mg  1 mg Intramuscular Q6H PRN    LORazepam (ATIVAN) tablet 1 mg  1 mg Oral Q6H PRN    LORazepam (ATIVAN) tablet 1 5 mg  1 5 mg Oral Q6H PRN    LORazepam (ATIVAN) tablet 1 5 mg  1 5 mg Oral TID    LORazepam (ATIVAN) tablet 2 mg  2 mg Oral Q6H PRN    magnesium hydroxide (MILK OF MAGNESIA) 400 mg/5 mL oral suspension 20 mL  20 mL Oral Daily PRN    mirtazapine (REMERON) tablet 15 mg  15 mg Oral HS    multivitamin-minerals (CENTRUM) tablet 1 tablet  1 tablet Oral Daily    nicotine (NICODERM CQ) 21 mg/24 hr TD 24 hr patch 1 patch  1 patch Transdermal Daily    OLANZapine (ZyPREXA ZYDIS) dispersible tablet 10 mg  10 mg Oral Q3H PRN    OLANZapine (ZyPREXA) IM injection 10 mg  10 mg Intramuscular Q3H PRN    QUEtiapine (SEROquel) tablet 100 mg  100 mg Oral HS    risperiDONE (RisperDAL M-TABS) dispersible tablet 1 mg  1 mg Oral Q12H PRN    thiamine (VITAMIN B1) tablet 100 mg  100 mg Oral Daily    traZODone (DESYREL) tablet 50 mg  50 mg Oral HS PRN    and planned medication changes: Taper ativan from 2mg TID to 1 5mg TID po daily for alcohol withdrawal symptoms  Add Remeron 15mg po daily at bedtime for depression and insomnia  Add seroquel 100 mg daily at bedtime for mood symptoms    Labs:     -Depakote lvl tomorrow  -Repeat CBC, BMP tomorrow

## 2019-10-21 NOTE — PROGRESS NOTES
Patient remains unofficially on constant observation  Patient has been seclusive to his room, appears sad and depressed; he reports feeling "bad" and "depressed " Patient is denying SI, but appears hopeless/helpless  He denies any symptoms of alcohol withdrawal  He is scant and soft spoken  Pt is cooperative  Will continue to monitor

## 2019-10-22 LAB
ALBUMIN SERPL BCP-MCNC: 3.7 G/DL (ref 3–5.2)
ALP SERPL-CCNC: 55 U/L (ref 43–122)
ALT SERPL W P-5'-P-CCNC: 26 U/L (ref 9–52)
ANION GAP SERPL CALCULATED.3IONS-SCNC: 4 MMOL/L (ref 5–14)
AST SERPL W P-5'-P-CCNC: 30 U/L (ref 17–59)
BASOPHILS # BLD AUTO: 0.1 THOUSANDS/ΜL (ref 0–0.1)
BASOPHILS NFR BLD AUTO: 1 % (ref 0–1)
BILIRUB SERPL-MCNC: 0.4 MG/DL
BUN SERPL-MCNC: 12 MG/DL (ref 5–25)
CALCIUM SERPL-MCNC: 9.1 MG/DL (ref 8.4–10.2)
CHLORIDE SERPL-SCNC: 105 MMOL/L (ref 97–108)
CO2 SERPL-SCNC: 30 MMOL/L (ref 22–30)
CREAT SERPL-MCNC: 0.83 MG/DL (ref 0.7–1.5)
EOSINOPHIL # BLD AUTO: 0.2 THOUSAND/ΜL (ref 0–0.4)
EOSINOPHIL NFR BLD AUTO: 3 % (ref 0–6)
ERYTHROCYTE [DISTWIDTH] IN BLOOD BY AUTOMATED COUNT: 13 %
GFR SERPL CREATININE-BSD FRML MDRD: 101 ML/MIN/1.73SQ M
GLUCOSE P FAST SERPL-MCNC: 109 MG/DL (ref 70–99)
GLUCOSE SERPL-MCNC: 109 MG/DL (ref 70–99)
HCT VFR BLD AUTO: 43.3 % (ref 41–53)
HGB BLD-MCNC: 14.6 G/DL (ref 13.5–17.5)
LYMPHOCYTES # BLD AUTO: 2.5 THOUSANDS/ΜL (ref 0.5–4)
LYMPHOCYTES NFR BLD AUTO: 33 % (ref 25–45)
MACROCYTES BLD QL AUTO: PRESENT
MCH RBC QN AUTO: 34.7 PG (ref 26–34)
MCHC RBC AUTO-ENTMCNC: 33.6 G/DL (ref 31–36)
MCV RBC AUTO: 103 FL (ref 80–100)
MONOCYTES # BLD AUTO: 0.6 THOUSAND/ΜL (ref 0.2–0.9)
MONOCYTES NFR BLD AUTO: 7 % (ref 1–10)
NEUTROPHILS # BLD AUTO: 4.3 THOUSANDS/ΜL (ref 1.8–7.8)
NEUTS SEG NFR BLD AUTO: 56 % (ref 45–65)
PLATELET # BLD AUTO: 273 THOUSANDS/UL (ref 150–450)
PLATELET BLD QL SMEAR: ADEQUATE
PMV BLD AUTO: 8.4 FL (ref 8.9–12.7)
POTASSIUM SERPL-SCNC: 4.4 MMOL/L (ref 3.6–5)
PROT SERPL-MCNC: 6.5 G/DL (ref 5.9–8.4)
RBC # BLD AUTO: 4.19 MILLION/UL (ref 4.5–5.9)
RBC MORPH BLD: NORMAL
SODIUM SERPL-SCNC: 139 MMOL/L (ref 137–147)
VALPROATE SERPL-MCNC: 62.9 UG/ML (ref 50–120)
WBC # BLD AUTO: 7.7 THOUSAND/UL (ref 4.5–11)

## 2019-10-22 PROCEDURE — 99231 SBSQ HOSP IP/OBS SF/LOW 25: CPT | Performed by: NURSE PRACTITIONER

## 2019-10-22 PROCEDURE — 80164 ASSAY DIPROPYLACETIC ACD TOT: CPT | Performed by: PSYCHIATRY & NEUROLOGY

## 2019-10-22 PROCEDURE — 85025 COMPLETE CBC W/AUTO DIFF WBC: CPT | Performed by: PSYCHIATRY & NEUROLOGY

## 2019-10-22 PROCEDURE — 80053 COMPREHEN METABOLIC PANEL: CPT | Performed by: PSYCHIATRY & NEUROLOGY

## 2019-10-22 RX ORDER — DIVALPROEX SODIUM 500 MG/1
1000 TABLET, DELAYED RELEASE ORAL EVERY EVENING
Status: DISCONTINUED | OUTPATIENT
Start: 2019-10-22 | End: 2019-10-24

## 2019-10-22 RX ORDER — DIVALPROEX SODIUM 500 MG/1
500 TABLET, DELAYED RELEASE ORAL DAILY
Status: DISCONTINUED | OUTPATIENT
Start: 2019-10-23 | End: 2019-10-24

## 2019-10-22 RX ORDER — QUETIAPINE FUMARATE 100 MG/1
200 TABLET, FILM COATED ORAL
Status: DISCONTINUED | OUTPATIENT
Start: 2019-10-22 | End: 2019-10-23

## 2019-10-22 RX ORDER — LORAZEPAM 1 MG/1
1 TABLET ORAL 3 TIMES DAILY
Status: DISCONTINUED | OUTPATIENT
Start: 2019-10-22 | End: 2019-10-23

## 2019-10-22 RX ADMIN — QUETIAPINE FUMARATE 200 MG: 100 TABLET ORAL at 21:29

## 2019-10-22 RX ADMIN — ATORVASTATIN CALCIUM 40 MG: 40 TABLET, FILM COATED ORAL at 08:54

## 2019-10-22 RX ADMIN — DIVALPROEX SODIUM 1000 MG: 500 TABLET, DELAYED RELEASE ORAL at 17:25

## 2019-10-22 RX ADMIN — Medication 1 TABLET: at 08:53

## 2019-10-22 RX ADMIN — LORAZEPAM 1.5 MG: 1 TABLET ORAL at 08:54

## 2019-10-22 RX ADMIN — LORAZEPAM 1 MG: 1 TABLET ORAL at 21:29

## 2019-10-22 RX ADMIN — MIRTAZAPINE 15 MG: 15 TABLET, FILM COATED ORAL at 21:29

## 2019-10-22 RX ADMIN — FOLIC ACID 1 MG: 1 TABLET ORAL at 08:55

## 2019-10-22 RX ADMIN — LORAZEPAM 1 MG: 1 TABLET ORAL at 15:17

## 2019-10-22 RX ADMIN — THIAMINE HCL TAB 100 MG 100 MG: 100 TAB at 08:54

## 2019-10-22 RX ADMIN — NICOTINE 1 PATCH: 21 PATCH, EXTENDED RELEASE TRANSDERMAL at 08:53

## 2019-10-22 RX ADMIN — DIVALPROEX SODIUM 500 MG: 500 TABLET, DELAYED RELEASE ORAL at 08:56

## 2019-10-22 NOTE — PROGRESS NOTES
Progress Note - Behavioral Health   Hillary Haney 48 y o  male MRN: [id number]  Unit/Bed#: [facility] 352-01 Encounter: [id number]    Assessment/Plan   Principal Problem:    Bipolar I disorder, most recent episode depressed, severe with psychotic features ([facility] )  Active Problems:    Post-traumatic stress disorder, chronic    Hyperlipidemia    Uncomplicated alcohol dependence ([facility] )    Cigarette nicotine dependence without complication    Medical clearance for psychiatric admission    Cannabis abuse, episodic    Learning disability    Methamphetamine abuse, episodic ([facility] )      Subjective:Patient was seen today for continuation of care, records reviewed and  patient was discussed with the morning case review team   Gorge Estrada was seen in his room where he was laying down on 1:1 visual observation  He presented with a flat affect and psychomotor retardation  He denies suicidal thoughts and ideations and today feels remorseful  Last night reported being awake until when had a "mental break down" about loosing about 25 years of marriage  Reported that he has adult children reported him as a "missing person" and the police has informed and that he was in the hospital   Prior to these they were unaware of patient's status  He reported that he does not feel ready to go back to work in shared how he was working 12- 14 hours daily  Denies any alcohol withdrawals except forrestless legs at bedtime  Confirmed he resigned a 72 hours and is being receptive with neccesary length of treatment  Patient was able to contract for safety and today described feeling  stupid for even thinking about jumping off the window when he was suicidal   Denied any other type of plan  Patient denies endorsing any suicidal or homicidal ideation  Does not seem to be experiencing any manic or psychotic symptoms during our encounter  Remains medication compliance  Denies any side effects from medications  One-to-one will be discontinued  VPA 62 9 today  Current Medications:  Current Facility-Administered Medications   Medication Dose Route Frequency    acetaminophen (TYLENOL) tablet 650 mg  650 mg Oral Q6H PRN    atorvastatin (LIPITOR) tablet 40 mg  40 mg Oral Daily    benztropine (COGENTIN) injection 1 mg  1 mg Intramuscular Q6H PRN    benztropine (COGENTIN) tablet 1 mg  1 mg Oral Q6H PRN    divalproex sodium (DEPAKOTE) EC tablet 1,000 mg  1,000 mg Oral QPM    [START ON 10/23/2019] divalproex sodium (DEPAKOTE) EC tablet 500 mg  500 mg Oral Daily    folic acid (FOLVITE) tablet 1 mg  1 mg Oral Daily    haloperidol (HALDOL) tablet 5 mg  5 mg Oral Q8H PRN    haloperidol lactate (HALDOL) injection 5 mg  5 mg Intramuscular Q8H PRN    hydrOXYzine HCL (ATARAX) tablet 25 mg  25 mg Oral Q6H PRN    hydrOXYzine HCL (ATARAX) tablet 50 mg  50 mg Oral Q6H PRN    hydrOXYzine HCL (ATARAX) tablet 75 mg  75 mg Oral Q6H PRN    ibuprofen (MOTRIN) tablet 600 mg  600 mg Oral Q6H PRN    ibuprofen (MOTRIN) tablet 800 mg  800 mg Oral Q8H PRN    LORazepam (ATIVAN) 2 mg/mL injection 1 mg  1 mg Intramuscular Q6H PRN    LORazepam (ATIVAN) tablet 1 mg  1 mg Oral Q6H PRN    LORazepam (ATIVAN) tablet 1 mg  1 mg Oral TID    LORazepam (ATIVAN) tablet 1 5 mg  1 5 mg Oral Q6H PRN    LORazepam (ATIVAN) tablet 2 mg  2 mg Oral Q6H PRN    magnesium hydroxide (MILK OF MAGNESIA) 400 mg/5 mL oral suspension 20 mL  20 mL Oral Daily PRN    mirtazapine (REMERON) tablet 15 mg  15 mg Oral HS    multivitamin-minerals (CENTRUM) tablet 1 tablet  1 tablet Oral Daily    nicotine (NICODERM CQ) 21 mg/24 hr TD 24 hr patch 1 patch  1 patch Transdermal Daily    OLANZapine (ZyPREXA ZYDIS) dispersible tablet 10 mg  10 mg Oral Q3H PRN    OLANZapine (ZyPREXA) IM injection 10 mg  10 mg Intramuscular Q3H PRN    QUEtiapine (SEROquel) tablet 200 mg  200 mg Oral HS    risperiDONE (RisperDAL M-TABS) dispersible tablet 1 mg  1 mg Oral Q12H PRN    thiamine (VITAMIN B1) tablet 100 mg  100 mg Oral Daily  traZODone (DESYREL) tablet 50 mg  50 mg Oral HS PRN       Behavioral Health Medications: all current active meds have been reviewed and continue current psychiatric medications  Vitals:  Vitals:    10/22/19 0813   BP: (!) 89/59   Pulse: 73   Resp: 16   Temp:    SpO2:        Laboratory results:    I have personally reviewed all pertinent laboratory/tests results  Most Recent Labs:   Lab Results   Component Value Date    WBC 7 70 10/22/2019    RBC 4 19 (L) 10/22/2019    HGB 14 6 10/22/2019    HCT 43 3 10/22/2019     10/22/2019    RDW 13 0 10/22/2019    NEUTROABS 4 30 10/22/2019    SODIUM 139 10/22/2019    K 4 4 10/22/2019     10/22/2019    CO2 30 10/22/2019    BUN 12 10/22/2019    CREATININE 0 83 10/22/2019    GLUC 109 (H) 10/22/2019    GLUF 109 (H) 10/22/2019    CALCIUM 9 1 10/22/2019    AST 30 10/22/2019    ALT 26 10/22/2019    ALKPHOS 55 10/22/2019    TP 6 5 10/22/2019    ALB 3 7 10/22/2019    TBILI 0 40 10/22/2019    CHOLESTEROL 165 10/19/2019    HDL 42 10/19/2019    TRIG 146 10/19/2019    LDLCALC 94 10/19/2019    [place] 123 10/19/2019    VALPROICTOT 62 9 10/22/2019     Depakote:   Lab Results   Component Value Date    VALPROICTOT 62 9 10/22/2019       Psychiatric Review of Systems:    Behavior over the last 24 hours:  improved     Sleep: insomnia  Appetite: normal  Medication side effects: No  ROS: no complaints    Mental Status Evaluation:    Appearance:  casually dressed, dressed appropriately   Behavior:  pleasant, cooperative, calm, fair eye contact, psychomotor retardation   Speech:  slow, scant, delayed, soft, decreased volume   Mood:  depressed   Affect:  flat, mood-congruent   Thought Process:  coherent, goal directed, linear   Associations: intact associations   Thought Content:  no overt delusions, ruminating thoughts   Perceptual Disturbances: no auditory hallucinations, no visual hallucinations   Risk Potential: Suicidal ideation - None at present, remorseful now, contracts for safety on the unit, would talk to staff if not feeling safe on the unit  Homicidal ideation - None  Potential for aggression - No   Sensorium:  oriented to person, place, time/date and situation   Memory:  recent and remote memory grossly intact   Consciousness:  alert and awake   Attention: attention span and concentration are age appropriate   Insight:  improving   Judgment: improving   Gait/Station: normal gait/station, normal balance   Motor Activity: no abnormal movements       Progress Toward Goals:     Improving     Assessment/Plan   Principal Problem:    Bipolar I disorder, most recent episode depressed, severe with psychotic features ([facility] )  Active Problems:    Post-traumatic stress disorder, chronic    Hyperlipidemia    Uncomplicated alcohol dependence ([facility] )    Cigarette nicotine dependence without complication    Medical clearance for psychiatric admission    Cannabis abuse, episodic    Learning disability    Methamphetamine abuse, episodic ([facility] 75 )      Recommended Treatment: Continue with group therapy, milieu therapy and occupational therapy  Treatment plan and medication changes discussed and per the attending physician the plan is: 1  Behavioral Health checks every 7 minutes  2  Continue with current medication regimen  3  Continue with Ativan taper 1 mg TID daily, Seroquel increased from 100 to 200 mg daily at HS and Depakote increased to 1000 mg daily at HS and 500 mg daily in the am Remeron 15 mg daily at HS and vitamins to supplement deficiencies from alcohol abuse  VPA to be checked on 10/24  4  Will d/c 1:1 as pt is not longer suicidal        Risks / Benefits of Treatment:     Risks, benefits, and possible side effects of medications explained to patient and patient verbalizes understanding and agreement for treatment  Counseling / Coordination of Care:     Patient's progress reviewed with nursing staff  Medications, treatment progress and treatment plan reviewed with patient    Supportive counseling provided to the patient            Wyatt King

## 2019-10-22 NOTE — PROGRESS NOTES
Pt maintained on continual observation visible  throughout the night  He was awake one time during the night and wanted a snack but staff explained to him that he should not eat due to lab work in AM   The patient made no attempts to harm self

## 2019-10-22 NOTE — PLAN OF CARE
Problem: Depression  Goal: Verbalize thoughts and feelings  Description  Interventions:  - Assess and re-assess patient's level of risk   - Engage patient in 1:1 interactions, daily, for a minimum of 15 minutes   - Encourage patient to express feelings, fears, frustrations, hopes   Outcome: Progressing  Goal: Refrain from harming self  Description  Interventions:  - Monitor patient closely, per order   - Supervise medication ingestion, monitor effects and side effects   Outcome: Progressing  Goal: Refrain from self-neglect  Outcome: Progressing     Problem: SUBSTANCE USE/ABUSE  Goal: By discharge, patient will have ongoing treatment plan addressing chemical dependency  Description  INTERVENTIONS:  - Assist patient with resources and/or appointments for ongoing recovery based living  Outcome: Progressing     Problem: Nutrition/Hydration-ADULT  Goal: Nutrient/Hydration intake appropriate for improving, restoring or maintaining nutritional needs  Description  Monitor and assess patient's nutrition/hydration status for malnutrition  Collaborate with interdisciplinary team and initiate plan and interventions as ordered  Monitor patient's weight and dietary intake as ordered or per policy  Utilize nutrition screening tool and intervene as necessary  Determine patient's food preferences and provide high-protein, high-caloric foods as appropriate       INTERVENTIONS:  - Monitor oral intake, urinary output, labs, and treatment plans  - Assess nutrition and hydration status and recommend course of action  - Evaluate amount of meals eaten  - Assist patient with eating if necessary   - Allow adequate time for meals  - Recommend/ encourage appropriate diets, oral nutritional supplements, and vitamin/mineral supplements  - Order, calculate, and assess calorie counts as needed  - Recommend, monitor, and adjust tube feedings and TPN/PPN based on assessed needs  - Assess need for intravenous fluids  - Provide specific nutrition/hydration education as appropriate  - Include patient/family/caregiver in decisions related to nutrition  Outcome: Progressing     Problem: Depression  Goal: Treatment Goal: Demonstrate behavioral control of depressive symptoms, verbalize feelings of improved mood/affect, and adopt new coping skills prior to discharge  Outcome: Not Progressing  Goal: Refrain from isolation  Description  Interventions:  - Develop a trusting relationship   - Encourage socialization   Outcome: Not Progressing

## 2019-10-22 NOTE — PROGRESS NOTES
Patient remains on continual visual observation  Withdrew to his room after breakfast  Presented as depressed and tearful  Said he didn't sleep well because "he had a lot on his mind " Said the thoughts were "regrets, mostly " When RN asked if he wanted to talk about it he became more tearful and said, "Please don't get me started " Patient said he was no longer suicidal  Said "I'm going to find my happy" but was tearful and appeared almost in pain when doing so  Patient denied other symptoms and needs

## 2019-10-22 NOTE — PROGRESS NOTES
Pt presents as visible in public areas as well as bedroom; less isolative today  Observed as depressed but less anxious than last assessment  Reports not feeling suicidal anymore, but anxious at times  Compliant with meds  Currently denies SI, HI and A/V hallucinations  Requests "the key to get out of here" with a smile; stating he desires to get back to work and spoke with his boss about coming back

## 2019-10-22 NOTE — PROGRESS NOTES
10/22/19 1100   Activity/Group Checklist   Group   (recovery group)   Attendance Attended   Attendance Duration (min) 46-60   Interactions Interacted appropriately   Affect/Mood Appropriate   Goals Achieved Identified feelings; Identified triggers; Discussed self-esteem issues; Able to listen to others; Able to engage in interactions; Able to self-disclose; Able to recieve feedback   Patient's mood is labile  He is focused on the loss of his wife and betrayal of his friend  He is hurt and angry unwilling to deal with his emotions  He states he gian by working and later requested a shot of SON whiskey  It was discussed how he uses alcohol to avoid dealing with his emotions  He is able to acknowledge he has a lot of resentments from childhood on

## 2019-10-22 NOTE — PROGRESS NOTES
10/22/19 0900   Team Meeting   Meeting Type Daily Rounds   Team Members Present   Team Members Present Physician;Nurse;;; Other (Discipline and Name)   Physician Team Member [address] Team Member ISAI FINNEGAN  [facility] Team Member [id number] Work Team Member Eris   Other (Discipline and Name) KAMILLA Chaland -CPS, Lydia duque student   Patient/Family Present   Patient Present No   Patient's Family Present No        10/22/19 0900   Team Meeting   Meeting Type Daily Rounds   Team Members Present   Team Members Present Physician;Nurse;;; Other (Discipline and Name)   Physician Team Member [address] Team Member ISAI FINNEGAN  [facility] Team Member [address] Work Team Member Eris   Other (Discipline and Name) KAMILLA Cha -CPS, Lydia Wallace- dunia student   Patient/Family Present   Patient Present No   Patient's Family Present No     CIWA to continue while Ativan is being tapered

## 2019-10-22 NOTE — PROGRESS NOTES
Pt presents mostly visible in bedroom  Observed as calm and tired on approach, often resting in bed  Reports small improvements in psychiatric state  Some less depression and anxiety  Denies current SI but would not elaborate on this change, offering no observable findings to suggest he is no longer risk towards self  Compliant with meds  No complaints or questions at this time  1:1 visual distance continued

## 2019-10-23 PROCEDURE — 99231 SBSQ HOSP IP/OBS SF/LOW 25: CPT | Performed by: NURSE PRACTITIONER

## 2019-10-23 RX ORDER — BENZTROPINE MESYLATE 1 MG/1
1 TABLET ORAL
Status: DISCONTINUED | OUTPATIENT
Start: 2019-10-23 | End: 2019-10-29 | Stop reason: HOSPADM

## 2019-10-23 RX ORDER — LORAZEPAM 0.5 MG/1
0.5 TABLET ORAL 3 TIMES DAILY
Status: DISCONTINUED | OUTPATIENT
Start: 2019-10-23 | End: 2019-10-24

## 2019-10-23 RX ORDER — QUETIAPINE FUMARATE 300 MG/1
300 TABLET, FILM COATED ORAL
Status: DISCONTINUED | OUTPATIENT
Start: 2019-10-23 | End: 2019-10-24

## 2019-10-23 RX ADMIN — NICOTINE POLACRILEX 4 MG: 2 GUM, CHEWING ORAL at 11:50

## 2019-10-23 RX ADMIN — QUETIAPINE FUMARATE 300 MG: 300 TABLET ORAL at 21:15

## 2019-10-23 RX ADMIN — Medication 1 TABLET: at 08:53

## 2019-10-23 RX ADMIN — ATORVASTATIN CALCIUM 40 MG: 40 TABLET, FILM COATED ORAL at 08:53

## 2019-10-23 RX ADMIN — LORAZEPAM 0.5 MG: 0.5 TABLET ORAL at 16:00

## 2019-10-23 RX ADMIN — BENZTROPINE MESYLATE 1 MG: 1 TABLET ORAL at 21:15

## 2019-10-23 RX ADMIN — NICOTINE POLACRILEX 4 MG: 2 GUM, CHEWING ORAL at 19:12

## 2019-10-23 RX ADMIN — LORAZEPAM 0.5 MG: 0.5 TABLET ORAL at 21:15

## 2019-10-23 RX ADMIN — DIVALPROEX SODIUM 500 MG: 500 TABLET, DELAYED RELEASE ORAL at 08:54

## 2019-10-23 RX ADMIN — THIAMINE HCL TAB 100 MG 100 MG: 100 TAB at 08:53

## 2019-10-23 RX ADMIN — LORAZEPAM 1 MG: 1 TABLET ORAL at 08:52

## 2019-10-23 RX ADMIN — FOLIC ACID 1 MG: 1 TABLET ORAL at 08:53

## 2019-10-23 RX ADMIN — DIVALPROEX SODIUM 1000 MG: 500 TABLET, DELAYED RELEASE ORAL at 17:25

## 2019-10-23 RX ADMIN — NICOTINE 1 PATCH: 21 PATCH, EXTENDED RELEASE TRANSDERMAL at 08:56

## 2019-10-23 RX ADMIN — MIRTAZAPINE 15 MG: 15 TABLET, FILM COATED ORAL at 21:15

## 2019-10-23 NOTE — PLAN OF CARE
Problem: Depression  Goal: Treatment Goal: Demonstrate behavioral control of depressive symptoms, verbalize feelings of improved mood/affect, and adopt new coping skills prior to discharge  Outcome: Progressing  Goal: Verbalize thoughts and feelings  Description  Interventions:  - Assess and re-assess patient's level of risk   - Engage patient in 1:1 interactions, daily, for a minimum of 15 minutes   - Encourage patient to express feelings, fears, frustrations, hopes   Outcome: Progressing  Goal: Refrain from harming self  Description  Interventions:  - Monitor patient closely, per order   - Supervise medication ingestion, monitor effects and side effects   Outcome: Progressing  Goal: Refrain from isolation  Description  Interventions:  - Develop a trusting relationship   - Encourage socialization   Outcome: Progressing  Goal: Refrain from self-neglect  Outcome: Progressing     Problem: SUBSTANCE USE/ABUSE  Goal: By discharge, patient will have ongoing treatment plan addressing chemical dependency  Description  INTERVENTIONS:  - Assist patient with resources and/or appointments for ongoing recovery based living  Outcome: Progressing     Problem: Nutrition/Hydration-ADULT  Goal: Nutrient/Hydration intake appropriate for improving, restoring or maintaining nutritional needs  Description  Monitor and assess patient's nutrition/hydration status for malnutrition  Collaborate with interdisciplinary team and initiate plan and interventions as ordered  Monitor patient's weight and dietary intake as ordered or per policy  Utilize nutrition screening tool and intervene as necessary  Determine patient's food preferences and provide high-protein, high-caloric foods as appropriate       INTERVENTIONS:  - Monitor oral intake, urinary output, labs, and treatment plans  - Assess nutrition and hydration status and recommend course of action  - Evaluate amount of meals eaten  - Assist patient with eating if necessary   - Allow adequate time for meals  - Recommend/ encourage appropriate diets, oral nutritional supplements, and vitamin/mineral supplements  - Order, calculate, and assess calorie counts as needed  - Recommend, monitor, and adjust tube feedings and TPN/PPN based on assessed needs  - Assess need for intravenous fluids  - Provide specific nutrition/hydration education as appropriate  - Include patient/family/caregiver in decisions related to nutrition  Outcome: Progressing

## 2019-10-23 NOTE — PROGRESS NOTES
Patient reported having slept better last night than the previous night, but said he's still experiencing restless leg symptoms and night that are bothering him

## 2019-10-23 NOTE — PROGRESS NOTES
[facility] NOTE     10/23/19 1000   Activity/Group Checklist   Group Life Skills  (Roles/personal labels)   Attendance Attended   Attendance Duration (min) 16-30   Interactions Other (Comment)  (Reserved, Scant)   Affect/Mood Appropriate   Goals Achieved Able to listen to others; Able to engage in interactions   Objectives/Key Points  Define Self Esteem (positive/negative)  Raise awareness of personal strengths  Define ways to bolster positive self esteem  Understand factors playing into negative self esteem

## 2019-10-23 NOTE — PROGRESS NOTES
10/23/19 1415   Activity/Group Checklist   Group Other (Comment)  (Art Therapy Process Group/Quotes, Discussion)   Attendance Attended   Attendance Duration (min) Greater than 60   Interactions Interacted appropriately   Affect/Mood Appropriate   Goals Achieved Identified feelings; Discussed coping strategies; Able to listen to others; Able to engage in interactions; Able to reflect/comment on own behavior;Able to manage/cope with feelings; Able to self-disclose; Able to recieve feedback; Able to give feedback to another  (Able to engage materials; full participation/gained insight)

## 2019-10-23 NOTE — PROGRESS NOTES
Pt presents as visible in public areas as well as bedroom  Observed as calm and cooperative on approach  Appears brighter, more alert and more visable  Reports improvements in depression and anxiety  Stated one episode of increased anxiety but pt was able to maintain control  Compliant with meds  Currently denies SI, HI and A/V hallucinations  No complaints or questions at this time

## 2019-10-23 NOTE — CASE MANAGEMENT
Pt stated he is still employed  Pt spoke to employer this morning and confirmed  Pt wants to leave and go back to work  Pt was not tearful for the first time  Pt stated he was sleeping better  Pt spoke to his daughter and son yesterday  Improvement from previous conversations where Pt cried and spoke of his ex-wife

## 2019-10-23 NOTE — PROGRESS NOTES
Licha Mejía was in a good mood today, joking and laughing  Licha Mejía stated he slept well and wasn't having any withdrawal symptoms  He seemed happy to show me his steady hand was not shaky  Licha Mejía was very cooperative with taking his medications  He was excited his request for nicotine gum was approved and available  Licha Mejía also denied having any SI     Patient CIWA scores 0 this shift

## 2019-10-23 NOTE — PROGRESS NOTES
10/23/19 0917   Team Meeting   Meeting Type Daily Rounds   Team Members Present   Team Members Present Physician;Nurse;;; Other (Discipline and Name)   Physician Team Member [address] Team Member Lonnie   Care Management Team Member Sam   Social Work Team Member Sotero   Other (Discipline and Name) Jeyson Moreno; LUTHER and Star [employer] Med student   Patient/Family Present   Patient Present No   Patient's Family Present No   Will continue with medication adjustments

## 2019-10-23 NOTE — PROGRESS NOTES
10/22/19 1415   Activity/Group Checklist   Group Other (Comment)  (Art Therapy Process Group/Open Choice, Discussion)   Attendance Attended   Attendance Duration (min) 46-60  (In and out several times)   Interactions Interacted appropriately   Affect/Mood Blunted/flat  (Appeared depressed/solemn)   Goals Achieved Able to listen to others; Able to recieve feedback  (Able to briefly engage materials; limited participation)

## 2019-10-23 NOTE — PROGRESS NOTES
Progress Note - Behavioral Health   Francois Garay 48 y o  male MRN: [id number]  Unit/Bed#: [facility] 352-01 Encounter: [id number]    Assessment/Plan   Principal Problem:    Bipolar I disorder, most recent episode depressed, severe with psychotic features ([facility] )  Active Problems:    Post-traumatic stress disorder, chronic    Hyperlipidemia    Uncomplicated alcohol dependence ([facility] )    Cigarette nicotine dependence without complication    Medical clearance for psychiatric admission    Cannabis abuse, episodic    Learning disability    Methamphetamine abuse, episodic ([facility] )      Subjective:Patient was seen today for continuation of care, records reviewed and  patient was discussed with the morning case review team   Alexandranu Gaston  presented calm, cooperative, and with brighter affect  He reported difficulty sleeping as he again experienced restless legs at bedtime  Continues to talk about his wife and regrets  Denies any alcohol withdrawal symptoms, on Ativan taper  He was seen in the day room, has been attending groups, and has been sociable  He reported that the best medicine for him was his job today  Reported that he will stay in [facility] to work for 2 years at [employer]  and plans to go back to [place] afterwards  Shared that he his adult children leave in [place]   He continues to lack insight into this attempt and into alcohol use  He denies suicide and homicidal ideation currently  He was laughing when he say that he is kids reported him "missing" and that made him feel "good" "I guess I can't just disappear"  Does not seem to be experiencing any manic or psychotic symptoms during our encounter  Remains medication compliance  Denies any side effects from medications  Seroquel will be increased, Cogentin to the added bedtime  Patient requesting prices on current medications when assessed about compliance      Current Medications:  Current Facility-Administered Medications   Medication Dose Route Frequency    acetaminophen (TYLENOL) tablet 650 mg  650 mg Oral Q6H PRN    atorvastatin (LIPITOR) tablet 40 mg  40 mg Oral Daily    benztropine (COGENTIN) injection 1 mg  1 mg Intramuscular Q6H PRN    benztropine (COGENTIN) tablet 1 mg  1 mg Oral Q6H PRN    benztropine (COGENTIN) tablet 1 mg  1 mg Oral HS    divalproex sodium (DEPAKOTE) EC tablet 1,000 mg  1,000 mg Oral QPM    divalproex sodium (DEPAKOTE) EC tablet 500 mg  500 mg Oral Daily    folic acid (FOLVITE) tablet 1 mg  1 mg Oral Daily    haloperidol (HALDOL) tablet 5 mg  5 mg Oral Q8H PRN    haloperidol lactate (HALDOL) injection 5 mg  5 mg Intramuscular Q8H PRN    hydrOXYzine HCL (ATARAX) tablet 25 mg  25 mg Oral Q6H PRN    hydrOXYzine HCL (ATARAX) tablet 50 mg  50 mg Oral Q6H PRN    hydrOXYzine HCL (ATARAX) tablet 75 mg  75 mg Oral Q6H PRN    ibuprofen (MOTRIN) tablet 600 mg  600 mg Oral Q6H PRN    ibuprofen (MOTRIN) tablet 800 mg  800 mg Oral Q8H PRN    LORazepam (ATIVAN) 2 mg/mL injection 1 mg  1 mg Intramuscular Q6H PRN    LORazepam (ATIVAN) tablet 0 5 mg  0 5 mg Oral TID    LORazepam (ATIVAN) tablet 1 mg  1 mg Oral Q6H PRN    LORazepam (ATIVAN) tablet 1 5 mg  1 5 mg Oral Q6H PRN    LORazepam (ATIVAN) tablet 2 mg  2 mg Oral Q6H PRN    magnesium hydroxide (MILK OF MAGNESIA) 400 mg/5 mL oral suspension 20 mL  20 mL Oral Daily PRN    mirtazapine (REMERON) tablet 15 mg  15 mg Oral HS    multivitamin-minerals (CENTRUM) tablet 1 tablet  1 tablet Oral Daily    nicotine (NICODERM CQ) 21 mg/24 hr TD 24 hr patch 1 patch  1 patch Transdermal Daily    nicotine polacrilex (NICORETTE) gum 4 mg  4 mg Oral Q4H PRN    OLANZapine (ZyPREXA ZYDIS) dispersible tablet 10 mg  10 mg Oral Q3H PRN    OLANZapine (ZyPREXA) IM injection 10 mg  10 mg Intramuscular Q3H PRN    QUEtiapine (SEROquel) tablet 300 mg  300 mg Oral HS    risperiDONE (RisperDAL M-TABS) dispersible tablet 1 mg  1 mg Oral Q12H PRN    thiamine (VITAMIN B1) tablet 100 mg  100 mg Oral Daily    traZODone (DESYREL) tablet 50 mg  50 mg Oral HS PRN       Behavioral Health Medications: all current active meds have been reviewed and continue current psychiatric medications  Vitals:  Vitals:    10/23/19 1100   BP: 110/83   Pulse: 77   Resp: 16   Temp:    SpO2:        Laboratory results:    I have personally reviewed all pertinent laboratory/tests results  Most Recent Labs:   Lab Results   Component Value Date    WBC 7 70 10/22/2019    RBC 4 19 (L) 10/22/2019    HGB 14 6 10/22/2019    HCT 43 3 10/22/2019     10/22/2019    RDW 13 0 10/22/2019    NEUTROABS 4 30 10/22/2019    SODIUM 139 10/22/2019    K 4 4 10/22/2019     10/22/2019    CO2 30 10/22/2019    BUN 12 10/22/2019    CREATININE 0 83 10/22/2019    GLUC 109 (H) 10/22/2019    GLUF 109 (H) 10/22/2019    CALCIUM 9 1 10/22/2019    AST 30 10/22/2019    ALT 26 10/22/2019    ALKPHOS 55 10/22/2019    TP 6 5 10/22/2019    ALB 3 7 10/22/2019    TBILI 0 40 10/22/2019    CHOLESTEROL 165 10/19/2019    HDL 42 10/19/2019    TRIG 146 10/19/2019    LDLCALC 94 10/19/2019    [place] 123 10/19/2019    VALPROICTOT 62 9 10/22/2019     Depakote:   Lab Results   Component Value Date    VALPROICTOT 62 9 10/22/2019       Psychiatric Review of Systems:    Behavior over the last 24 hours:  improved     Sleep: normal  Appetite: normal  Medication side effects: No  ROS: no complaints    Mental Status Evaluation:  Appearance:  casually dressed, dressed appropriately   Behavior:  pleasant, cooperative, calm,  improved eye contact, less psychomotor retardation   Speech:  slow, scant, delayed, soft, decreased volume   Mood:  depressed   Affect:  flat, mood-congruent, more reactive   Thought Process:  coherent, goal directed, linear   Associations: intact associations   Thought Content:  no overt delusions, ruminating thoughts   Perceptual Disturbances: no auditory hallucinations, no visual hallucinations   Risk Potential: Suicidal ideation - None at present, remorseful now, contracts for safety on the unit, would talk to staff if not feeling safe on the unit  Homicidal ideation - None  Potential for aggression - No   Sensorium:  oriented to person, place, time/date and situation   Memory:  recent and remote memory grossly intact   Consciousness:  alert and awake   Attention: attention span and concentration are age appropriate   Insight:  improving   Judgment: improving   Gait/Station: normal gait/station, normal balance   Motor Activity: no abnormal movements         Progress Toward Goals:     Improving    Assessment/Plan   Principal Problem:    Bipolar I disorder, most recent episode depressed, severe with psychotic features ([facility] )  Active Problems:    Post-traumatic stress disorder, chronic    Hyperlipidemia    Uncomplicated alcohol dependence ([facility] )    Cigarette nicotine dependence without complication    Medical clearance for psychiatric admission    Cannabis abuse, episodic    Learning disability    Methamphetamine abuse, episodic ([facility] )      Recommended Treatment: Continue with group therapy, milieu therapy and occupational therapy  Treatment plan and medication changes discussed and per the attending physician the plan is:       1 Behavioral Health checks every 7 minutes  2  Continue with current medication regimen  3  Continue with Ativan taper 0 5 mg TID daily, Seroquel increased from 200 to 300 mg daily at HS, start Cogentin 1 mg daily at bedtime  Continue with Depakote 1000 mg daily at HS and 500 mg daily in the am, Remeron 15 mg daily at HS and vitamins to supplement deficiencies from alcohol abuse  4  VPA to be checked tomorrow  Risks / Benefits of Treatment:     Risks, benefits, and possible side effects of medications explained to patient and patient verbalizes understanding and agreement for treatment  Counseling / Coordination of Care:     Patient's progress reviewed with nursing staff    Medications, treatment progress and treatment plan reviewed with patient  Supportive counseling provided to the patient            Marylee Michael

## 2019-10-23 NOTE — PROGRESS NOTES
[facility] NOTE     10/23/19 1045   Activity/Group Checklist   Group Admission/Discharge  (completed admission DERs)   Attendance Attended   Attendance Duration (min) 0-15   Interactions Interacted appropriately   Affect/Mood Appropriate

## 2019-10-24 LAB — VALPROATE SERPL-MCNC: 59.2 UG/ML (ref 50–120)

## 2019-10-24 PROCEDURE — 80164 ASSAY DIPROPYLACETIC ACD TOT: CPT | Performed by: PSYCHIATRY & NEUROLOGY

## 2019-10-24 PROCEDURE — 99232 SBSQ HOSP IP/OBS MODERATE 35: CPT | Performed by: NURSE PRACTITIONER

## 2019-10-24 RX ORDER — LORAZEPAM 0.5 MG/1
0.5 TABLET ORAL 2 TIMES DAILY
Status: COMPLETED | OUTPATIENT
Start: 2019-10-24 | End: 2019-10-27

## 2019-10-24 RX ORDER — LORAZEPAM 0.5 MG/1
0.5 TABLET ORAL
Status: COMPLETED | OUTPATIENT
Start: 2019-10-24 | End: 2019-10-24

## 2019-10-24 RX ORDER — QUETIAPINE FUMARATE 400 MG/1
400 TABLET, FILM COATED ORAL
Status: DISCONTINUED | OUTPATIENT
Start: 2019-10-24 | End: 2019-10-28

## 2019-10-24 RX ORDER — DIVALPROEX SODIUM 500 MG/1
1000 TABLET, DELAYED RELEASE ORAL 2 TIMES DAILY
Status: DISCONTINUED | OUTPATIENT
Start: 2019-10-24 | End: 2019-10-29 | Stop reason: HOSPADM

## 2019-10-24 RX ADMIN — MIRTAZAPINE 15 MG: 15 TABLET, FILM COATED ORAL at 21:20

## 2019-10-24 RX ADMIN — Medication 1 TABLET: at 08:34

## 2019-10-24 RX ADMIN — LORAZEPAM 0.5 MG: 0.5 TABLET ORAL at 17:16

## 2019-10-24 RX ADMIN — DIVALPROEX SODIUM 1000 MG: 500 TABLET, DELAYED RELEASE ORAL at 17:16

## 2019-10-24 RX ADMIN — LORAZEPAM 0.5 MG: 0.5 TABLET ORAL at 08:35

## 2019-10-24 RX ADMIN — DIVALPROEX SODIUM 500 MG: 500 TABLET, DELAYED RELEASE ORAL at 08:35

## 2019-10-24 RX ADMIN — NICOTINE 1 PATCH: 21 PATCH, EXTENDED RELEASE TRANSDERMAL at 08:38

## 2019-10-24 RX ADMIN — LORAZEPAM 0.5 MG: 0.5 TABLET ORAL at 21:20

## 2019-10-24 RX ADMIN — BENZTROPINE MESYLATE 1 MG: 1 TABLET ORAL at 21:20

## 2019-10-24 RX ADMIN — THIAMINE HCL TAB 100 MG 100 MG: 100 TAB at 08:36

## 2019-10-24 RX ADMIN — FOLIC ACID 1 MG: 1 TABLET ORAL at 08:35

## 2019-10-24 RX ADMIN — QUETIAPINE 400 MG: 400 TABLET, FILM COATED ORAL at 21:20

## 2019-10-24 RX ADMIN — NICOTINE POLACRILEX 4 MG: 2 GUM, CHEWING ORAL at 19:06

## 2019-10-24 RX ADMIN — HYDROXYZINE HYDROCHLORIDE 50 MG: 50 TABLET ORAL at 13:12

## 2019-10-24 RX ADMIN — ATORVASTATIN CALCIUM 40 MG: 40 TABLET, FILM COATED ORAL at 08:36

## 2019-10-24 RX ADMIN — NICOTINE POLACRILEX 4 MG: 2 GUM, CHEWING ORAL at 13:12

## 2019-10-24 NOTE — PLAN OF CARE
Problem: Ineffective Coping  Goal: Participates in unit activities  Description  Interventions:  - Provide therapeutic environment   - Provide required programming   - Redirect inappropriate behaviors   Outcome: Progressing   Group attendance noted within last 24 hours

## 2019-10-24 NOTE — PROGRESS NOTES
Pt is compliant with medications  He complains of anxiety but feels improved overall  He denies SI/HI or hallucinations  He is calm and cooperative, friendly and polite with staff

## 2019-10-24 NOTE — PROGRESS NOTES
Progress Note - Behavioral Health   Jordantorito Shannon 48 y o  male MRN: [id number]  Unit/Bed#: [facility] 352-01 Encounter: [id number]    Assessment/Plan   Principal Problem:    Bipolar I disorder, most recent episode depressed, severe with psychotic features ([facility] )  Active Problems:    Post-traumatic stress disorder, chronic    Hyperlipidemia    Uncomplicated alcohol dependence ([facility] )    Cigarette nicotine dependence without complication    Medical clearance for psychiatric admission    Cannabis abuse, episodic    Learning disability    Methamphetamine abuse, episodic ([facility] )      Subjective:Patient was seen today for continuation of care, records reviewed and  patient was discussed with the morning case review team   Girma Lopez continues to report difficulty falling asleep as he stays awake for 2 hours prior asleep  He feels restless on the legs  He feels his anxiety has worsened and has been using a stress ball to cope, has been attending groups  Reports strong desire going back to work and believes that "working" is the medicine for him  He talked about relationships, and was unable to deny that he will drink alcohol once home  Improved insight  Patient denies endorsing any suicidal or homicidal ideation  Does not seem to be experiencing any manic or psychotic symptoms during our encounter  Remains medication compliance  Denies any side effects from medications  VPA 59 2  Depakote will be increased as also Seroquel  On Ativan taper  CIWA 0- VSS  May use Trazodone p r n   if difficulty sleeping        Current Medications:  Current Facility-Administered Medications   Medication Dose Route Frequency    acetaminophen (TYLENOL) tablet 650 mg  650 mg Oral Q6H PRN    atorvastatin (LIPITOR) tablet 40 mg  40 mg Oral Daily    benztropine (COGENTIN) injection 1 mg  1 mg Intramuscular Q6H PRN    benztropine (COGENTIN) tablet 1 mg  1 mg Oral Q6H PRN    benztropine (COGENTIN) tablet 1 mg  1 mg Oral HS    divalproex sodium (DEPAKOTE) EC tablet 1,000 mg  1,000 mg Oral BID    folic acid (FOLVITE) tablet 1 mg  1 mg Oral Daily    haloperidol (HALDOL) tablet 5 mg  5 mg Oral Q8H PRN    haloperidol lactate (HALDOL) injection 5 mg  5 mg Intramuscular Q8H PRN    hydrOXYzine HCL (ATARAX) tablet 25 mg  25 mg Oral Q6H PRN    hydrOXYzine HCL (ATARAX) tablet 50 mg  50 mg Oral Q6H PRN    hydrOXYzine HCL (ATARAX) tablet 75 mg  75 mg Oral Q6H PRN    ibuprofen (MOTRIN) tablet 600 mg  600 mg Oral Q6H PRN    ibuprofen (MOTRIN) tablet 800 mg  800 mg Oral Q8H PRN    LORazepam (ATIVAN) 2 mg/mL injection 1 mg  1 mg Intramuscular Q6H PRN    LORazepam (ATIVAN) tablet 0 5 mg  0 5 mg Oral BID    LORazepam (ATIVAN) tablet 1 mg  1 mg Oral Q6H PRN    LORazepam (ATIVAN) tablet 1 5 mg  1 5 mg Oral Q6H PRN    LORazepam (ATIVAN) tablet 2 mg  2 mg Oral Q6H PRN    magnesium hydroxide (MILK OF MAGNESIA) 400 mg/5 mL oral suspension 20 mL  20 mL Oral Daily PRN    mirtazapine (REMERON) tablet 15 mg  15 mg Oral HS    multivitamin-minerals (CENTRUM) tablet 1 tablet  1 tablet Oral Daily    nicotine (NICODERM CQ) 21 mg/24 hr TD 24 hr patch 1 patch  1 patch Transdermal Daily    nicotine polacrilex (NICORETTE) gum 4 mg  4 mg Oral Q4H PRN    OLANZapine (ZyPREXA ZYDIS) dispersible tablet 10 mg  10 mg Oral Q3H PRN    OLANZapine (ZyPREXA) IM injection 10 mg  10 mg Intramuscular Q3H PRN    QUEtiapine (SEROquel) tablet 400 mg  400 mg Oral HS    risperiDONE (RisperDAL M-TABS) dispersible tablet 1 mg  1 mg Oral Q12H PRN    thiamine (VITAMIN B1) tablet 100 mg  100 mg Oral Daily    traZODone (DESYREL) tablet 50 mg  50 mg Oral HS PRN       Behavioral Health Medications: all current active meds have been reviewed and continue current psychiatric medications  Vitals:  Vitals:    10/24/19 1238   BP: 102/58   Pulse: 68   Resp:    Temp:    SpO2:        Laboratory results:    I have personally reviewed all pertinent laboratory/tests results    Most Recent Labs:   Lab Results   Component Value Date    WBC 7 70 10/22/2019    RBC 4 19 (L) 10/22/2019    HGB 14 6 10/22/2019    HCT 43 3 10/22/2019     10/22/2019    RDW 13 0 10/22/2019    NEUTROABS 4 30 10/22/2019    SODIUM 139 10/22/2019    K 4 4 10/22/2019     10/22/2019    CO2 30 10/22/2019    BUN 12 10/22/2019    CREATININE 0 83 10/22/2019    GLUC 109 (H) 10/22/2019    GLUF 109 (H) 10/22/2019    CALCIUM 9 1 10/22/2019    AST 30 10/22/2019    ALT 26 10/22/2019    ALKPHOS 55 10/22/2019    TP 6 5 10/22/2019    ALB 3 7 10/22/2019    TBILI 0 40 10/22/2019    CHOLESTEROL 165 10/19/2019    HDL 42 10/19/2019    TRIG 146 10/19/2019    LDLCALC 94 10/19/2019    [place] 123 10/19/2019    VALPROICTOT 59 2 10/24/2019     Depakote:   Lab Results   Component Value Date    VALPROICTOT 59 2 10/24/2019       Psychiatric Review of Systems:    Behavior over the last 24 hours:  improved     Sleep: insomnia  Appetite: normal  Medication side effects: No  ROS: no complaints    Mental Status Evaluation:    Appearance:  casually dressed, dressed appropriately   Behavior:  pleasant, cooperative, calm,  improved eye contact, less psychomotor retardation   Speech:  slow, scant, delayed, soft, decreased volume   Mood:  depressed   Affect:  flat, mood-congruent, more reactive   Thought Process:  coherent, goal directed, linear   Associations: intact associations   Thought Content:  no overt delusions, ruminating thoughts   Perceptual Disturbances: no auditory hallucinations, no visual hallucinations   Risk Potential: Suicidal ideation - None at present, remorseful now, contracts for safety on the unit, would talk to staff if not feeling safe on the unit  Homicidal ideation - None  Potential for aggression - No   Sensorium:  oriented to person, place, time/date and situation   Memory:  recent and remote memory grossly intact   Consciousness:  alert and awake   Attention: attention span and concentration are age appropriate   Insight:  improving Judgment: improving   Gait/Station: normal gait/station, normal balance   Motor Activity: no abnormal movements         Progress Toward Goals:     Improving    Assessment/Plan   Principal Problem:    Bipolar I disorder, most recent episode depressed, severe with psychotic features ([facility] )  Active Problems:    Post-traumatic stress disorder, chronic    Hyperlipidemia    Uncomplicated alcohol dependence (H[facility])    Cigarette nicotine dependence without complication    Medical clearance for psychiatric admission    Cannabis abuse, episodic    Learning disability    Methamphetamine abuse, episodic ([facility] )      Recommended Treatment: Continue with group therapy, milieu therapy and occupational therapy  Treatment plan and medication changes discussed and per the attending physician the plan is: 1  Behavioral Health checks every 7 minutes  2  Continue with current medication regimen  3  Continue with Ativan taper 0 5 mg BID daily, Seroquel increased from 300 mg to 400 mg daily at HS, start Cogentin 1 mg daily at bedtime  Depakote 1000 mg BID daily, Remeron 15 mg daily at HS and vitamins to supplement deficiencies from alcohol abuse  4  VPA to be checked on Sunday  5  Pending discharge possibly for next week  Risks / Benefits of Treatment:     Risks, benefits, and possible side effects of medications explained to patient and patient verbalizes understanding and agreement for treatment  Counseling / Coordination of Care:     Patient's progress reviewed with nursing staff  Medications, treatment progress and treatment plan reviewed with patient  Supportive counseling provided to the patient            Jethro Camargo

## 2019-10-24 NOTE — PROGRESS NOTES
Lorteo Sanders stated he slept well, the best sleep he's had in a long time  Pt did state he still had restless legs but then he eventually fell asleep and slept great  Pt stated he wasn't having any withdrawal symptoms  He also denied having any SI  Pt said his appetite was good and hes eating well    Patient's CIWA scores 0 this shift

## 2019-10-24 NOTE — PROGRESS NOTES
10/24/19 0800   Team Meeting   Meeting Type Daily Rounds   Team Members Present   Team Members Present Physician;Nurse;;; Other (Discipline and Name)   Physician Team Member [address] Team Member [facility] Management Team Member Sam   Social Work Team Member Tyler Sellers   Other (Discipline and Name) Assigned to Dr Jean Villarreal   Patient/Family Present   Patient Present No   Patient's Family Present No     Increase depakote, repeat level on Sunday, continue to titrate Ativan

## 2019-10-24 NOTE — PROGRESS NOTES
Patient was originally anxious and preoccupied with wife after a call with his daughter after finding out wife is trying to move into daughter's house  Therapeutic communication exchanged with patient and was effective without need for prn meds  Pt felt relieved after conversation and thanked RN; appears to be accepting situation with wife  Observed as more social and visible in milieu  Compliant with meds  Denies SI, but reported poor sleep night prior  Anxiety and depression are controlled

## 2019-10-25 PROCEDURE — 99231 SBSQ HOSP IP/OBS SF/LOW 25: CPT | Performed by: NURSE PRACTITIONER

## 2019-10-25 RX ORDER — ROPINIROLE 1 MG/1
0.5 TABLET, FILM COATED ORAL
Status: DISCONTINUED | OUTPATIENT
Start: 2019-10-25 | End: 2019-10-29 | Stop reason: HOSPADM

## 2019-10-25 RX ADMIN — ROPINIROLE HYDROCHLORIDE 0.5 MG: 1 TABLET, FILM COATED ORAL at 21:08

## 2019-10-25 RX ADMIN — NICOTINE 1 PATCH: 21 PATCH, EXTENDED RELEASE TRANSDERMAL at 09:08

## 2019-10-25 RX ADMIN — NICOTINE POLACRILEX 4 MG: 2 GUM, CHEWING ORAL at 21:11

## 2019-10-25 RX ADMIN — LORAZEPAM 1 MG: 1 TABLET ORAL at 15:48

## 2019-10-25 RX ADMIN — DIVALPROEX SODIUM 1000 MG: 500 TABLET, DELAYED RELEASE ORAL at 17:15

## 2019-10-25 RX ADMIN — LORAZEPAM 0.5 MG: 0.5 TABLET ORAL at 09:07

## 2019-10-25 RX ADMIN — Medication 1 TABLET: at 09:07

## 2019-10-25 RX ADMIN — DIVALPROEX SODIUM 1000 MG: 500 TABLET, DELAYED RELEASE ORAL at 09:07

## 2019-10-25 RX ADMIN — THIAMINE HCL TAB 100 MG 100 MG: 100 TAB at 09:07

## 2019-10-25 RX ADMIN — LORAZEPAM 0.5 MG: 0.5 TABLET ORAL at 17:15

## 2019-10-25 RX ADMIN — FOLIC ACID 1 MG: 1 TABLET ORAL at 09:07

## 2019-10-25 RX ADMIN — ATORVASTATIN CALCIUM 40 MG: 40 TABLET, FILM COATED ORAL at 09:07

## 2019-10-25 RX ADMIN — QUETIAPINE 400 MG: 400 TABLET, FILM COATED ORAL at 21:08

## 2019-10-25 RX ADMIN — NICOTINE POLACRILEX 4 MG: 2 GUM, CHEWING ORAL at 10:54

## 2019-10-25 RX ADMIN — BENZTROPINE MESYLATE 1 MG: 1 TABLET ORAL at 21:09

## 2019-10-25 RX ADMIN — MIRTAZAPINE 15 MG: 15 TABLET, FILM COATED ORAL at 21:08

## 2019-10-25 NOTE — PLAN OF CARE
Problem: Ineffective Coping  Goal: Participates in unit activities  Description  Interventions:  - Provide therapeutic environment   - Provide required programming   - Redirect inappropriate behaviors   Outcome: Progressing   Group attendance participation noted within past 24 hours

## 2019-10-25 NOTE — PLAN OF CARE
Problem: Depression  Goal: Treatment Goal: Demonstrate behavioral control of depressive symptoms, verbalize feelings of improved mood/affect, and adopt new coping skills prior to discharge  10/25/2019 1638 by John Begum RN  Outcome: Progressing  10/25/2019 1409 by John Begum RN  Outcome: Progressing  Goal: Verbalize thoughts and feelings  Description  Interventions:  - Assess and re-assess patient's level of risk   - Engage patient in 1:1 interactions, daily, for a minimum of 15 minutes   - Encourage patient to express feelings, fears, frustrations, hopes   10/25/2019 1638 by John Begum RN  Outcome: Progressing  10/25/2019 1409 by John Begum RN  Outcome: Progressing  Goal: Refrain from harming self  Description  Interventions:  - Monitor patient closely, per order   - Supervise medication ingestion, monitor effects and side effects   10/25/2019 1638 by John Begum RN  Outcome: Progressing  10/25/2019 1409 by John Begum RN  Outcome: Progressing  Goal: Refrain from isolation  Description  Interventions:  - Develop a trusting relationship   - Encourage socialization   10/25/2019 1638 by John Begum RN  Outcome: Progressing  10/25/2019 1409 by John Begum RN  Outcome: Progressing  Goal: Refrain from self-neglect  10/25/2019 1638 by John Begum RN  Outcome: Progressing  10/25/2019 1409 by John Begum RN  Outcome: Progressing     Problem: SUBSTANCE USE/ABUSE  Goal: By discharge, patient will have ongoing treatment plan addressing chemical dependency  Description  INTERVENTIONS:  - Assist patient with resources and/or appointments for ongoing recovery based living  -Goal extended on 10/23/2019   10/25/2019 1638 by John Begum RN  Outcome: Progressing  10/25/2019 1409 by John Begum RN  Outcome: Progressing     Problem: Nutrition/Hydration-ADULT  Goal: Nutrient/Hydration intake appropriate for improving, restoring or maintaining nutritional needs  Description  Monitor and assess patient's nutrition/hydration status for malnutrition  Collaborate with interdisciplinary team and initiate plan and interventions as ordered  Monitor patient's weight and dietary intake as ordered or per policy  Utilize nutrition screening tool and intervene as necessary  Determine patient's food preferences and provide high-protein, high-caloric foods as appropriate       INTERVENTIONS:  - Monitor oral intake, urinary output, labs, and treatment plans  - Assess nutrition and hydration status and recommend course of action  - Evaluate amount of meals eaten  - Assist patient with eating if necessary   - Allow adequate time for meals  - Recommend/ encourage appropriate diets, oral nutritional supplements, and vitamin/mineral supplements  - Order, calculate, and assess calorie counts as needed  - Recommend, monitor, and adjust tube feedings and TPN/PPN based on assessed needs  - Assess need for intravenous fluids  - Provide specific nutrition/hydration education as appropriate  - Include patient/family/caregiver in decisions related to nutrition  10/25/2019 1638 by Sofia Rojas RN  Outcome: Progressing  10/25/2019 1409 by Sfoia Rojas RN  Outcome: Progressing     Problem: DISCHARGE PLANNING  Goal: Discharge to home or other facility with appropriate resources  Description  INTERVENTIONS:  - Identify barriers to discharge w/patient and caregiver  - Arrange for needed discharge resources and transportation as appropriate  - Identify discharge learning needs (meds, wound care, etc )  - Refer to Case Management Department for coordinating discharge planning if the patient needs post-hospital services based on physician/advanced practitioner order or complex needs related to functional status, cognitive ability, or social support system   10/25/2019 1638 by Sofia Rojas RN  Outcome: Progressing  10/25/2019 1409 by Sofia Rojas RN  Outcome: Progressing     Problem: Ineffective Coping  Goal: Participates in unit activities  Description  Interventions:  - Provide therapeutic environment   - Provide required programming   - Redirect inappropriate behaviors   10/25/2019 1638 by Steff Calderon RN  Outcome: Progressing  10/25/2019 1409 by Steff Calderon RN  Outcome: Progressing     Problem: SUBSTANCE USE/ABUSE  Goal: Will have no detox symptoms and will verbalize plan for changing substance-related behavior  Description  INTERVENTIONS:  - Monitor physical status and assess for symptoms of withdrawal  - Administer medication as ordered  - Provide emotional support with 1 on 1 interaction with staff  - Encourage recovery focused program/ addiction education  - Assess for verbalization of changing behaviors related to substance abuse  - Initiate consults and referrals as appropriate (Case Management, Spiritual Care, etc )  -Goal extended on 10/23/2019   10/25/2019 1638 by Steff Calderon RN  Outcome: Not Progressing  10/25/2019 1409 by Steff Calderon RN  Outcome: Progressing  Goal: By discharge, will develop insight into their chemical dependency and sustain motivation to continue in recovery  Description  INTERVENTIONS:  - Attends all daily group sessions and scheduled AA groups  - Actively practices coping skills through participation in the therapeutic community and adherence to program rules  - Reviews and completes assignments from individual treatment plan  - Assist patient development of understanding of their personal cycle of addiction and relapse triggers  -Goal extended on 10/23/2019   10/25/2019 1638 by Steff Calderon RN  Outcome: Not Progressing  10/25/2019 1409 by Steff aClderon RN  Outcome: Progressing

## 2019-10-25 NOTE — PROGRESS NOTES
Observation/interaction of client today showed elevated energy, walking the halls, positively interacting with staff and other clients on the unit  Denies SI, HI, auditory hallucinations, command  hallucinations, visual  Client is calm, able to hold a complete conversation, but displays a sad affect, sts feels anxious at times  Doesn't appear delusional, or manic  Client is eating well & attending group meetings  Expresses positive coping skills are talking, especially one to one and using his stress ball to cope with sadness  Verbalizes the need to move on, but says it's so hard because he loves his wife so  Client tearful with minimal eye contact  Upon discharge he states he will willfully regress to smoking, drinking and med non-compliance  Client admits his coping style isn't helpful, just a denial act

## 2019-10-25 NOTE — CASE MANAGEMENT
ANGELA called [facility] and requested an intake for Pt due to not having insurance  Pt contact information was given to IR

## 2019-10-25 NOTE — PROGRESS NOTES
10/24/19 1415   Activity/Group Checklist   Group Other (Comment)  (Art Therapy Process Group/Fear of Judgment, Discussion)   Attendance Attended   Attendance Duration (min) 46-60  (Patient left group early)   Interactions Interacted appropriately   Affect/Mood Appropriate

## 2019-10-25 NOTE — PROGRESS NOTES
Completed his relapse prevention form; he still plans on drinking and that he drinks due to his triggers  He is obsessed with his ex wife and refuses to accept their relationship as it is  He is scheduled for discharge on Tuesday and he believes he is ready  His affect is bright and he is joking around

## 2019-10-25 NOTE — PLAN OF CARE
Problem: Depression  Goal: Verbalize thoughts and feelings  Description  Interventions:  - Assess and re-assess patient's level of risk   - Engage patient in 1:1 interactions, daily, for a minimum of 15 minutes   - Encourage patient to express feelings, fears, frustrations, hopes   10/25/2019 1210 by ANGELA Hewitt  Outcome: Progressing  10/25/2019 1210 by ANGELA Hewitt  Outcome: Progressing  Goal: Refrain from harming self  Description  Interventions:  - Monitor patient closely, per order   - Supervise medication ingestion, monitor effects and side effects   10/25/2019 1210 by ANGELA Hewitt  Outcome: Progressing  10/25/2019 1210 by ANGELA Hewitt  Outcome: Progressing  Goal: Refrain from isolation  Description  Interventions:  - Develop a trusting relationship   - Encourage socialization   10/25/2019 1210 by ANGELA Hewitt  Outcome: Progressing  10/25/2019 1210 by ANGELA Hewitt  Outcome: Progressing     Problem: Ineffective Coping  Goal: Participates in unit activities  Description  Interventions:  - Provide therapeutic environment   - Provide required programming   - Redirect inappropriate behaviors   10/25/2019 1210 by ANGELA Hewitt  Outcome: Progressing  10/25/2019 1210 by ANGELA Hewitt  Outcome: Progressing     Problem: SUBSTANCE USE/ABUSE  Goal: Will have no detox symptoms and will verbalize plan for changing substance-related behavior  Description  INTERVENTIONS:  - Monitor physical status and assess for symptoms of withdrawal  - Administer medication as ordered  - Provide emotional support with 1 on 1 interaction with staff  - Encourage recovery focused program/ addiction education  - Assess for verbalization of changing behaviors related to substance abuse  - Initiate consults and referrals as appropriate (Case Management, Spiritual Care, etc )  -Goal extended on 10/23/2019   Outcome: Progressing  Goal: By discharge, will develop insight into their chemical dependency and sustain motivation to continue in recovery  Description  INTERVENTIONS:  - Attends all daily group sessions and scheduled AA groups  - Actively practices coping skills through participation in the therapeutic community and adherence to program rules  - Reviews and completes assignments from individual treatment plan  - Assist patient development of understanding of their personal cycle of addiction and relapse triggers  -Goal extended on 10/23/2019   Outcome: Progressing  Goal: By discharge, patient will have ongoing treatment plan addressing chemical dependency  Description  INTERVENTIONS:  - Assist patient with resources and/or appointments for ongoing recovery based living  -Goal extended on 10/23/2019   Outcome: Progressing     Problem: DISCHARGE PLANNING  Goal: Discharge to home or other facility with appropriate resources  Description  INTERVENTIONS:  - Identify barriers to discharge w/patient and caregiver  - Arrange for needed discharge resources and transportation as appropriate  - Identify discharge learning needs (meds, wound care, etc )  - Refer to Case Management Department for coordinating discharge planning if the patient needs post-hospital services based on physician/advanced practitioner order or complex needs related to functional status, cognitive ability, or social support system   Outcome: Progressing

## 2019-10-25 NOTE — PROGRESS NOTES
10/25/19 0700   Team Meeting   Meeting Type Daily Rounds   Team Members Present   Team Members Present Physician;Nurse;;; Other (Discipline and Name)   Physician Team Member [address] Team Member ISAI OLIVAS [facility] Management Team Member Sam   Social Work Team Member jerica Schulte   Other (Discipline and Name) LUTHER Martinez L Raynaldo Hough nursing student   Patient/Family Present   Patient Present No   Patient's Family Present No     CM spoke with daughter about pt baseline behavior  D/C Tuesday

## 2019-10-25 NOTE — PROGRESS NOTES
Progress Note - Behavioral Health   Ashley Hale 48 y o  male MRN: [id number]  Unit/Bed#: [facility] 352-01 Encounter: [id number]    Assessment/Plan   Principal Problem:    Bipolar I disorder, most recent episode depressed, severe with psychotic features ([facility] )  Active Problems:    Post-traumatic stress disorder, chronic    Hyperlipidemia    Uncomplicated alcohol dependence ([facility] )    Cigarette nicotine dependence without complication    Medical clearance for psychiatric admission    Cannabis abuse, episodic    Learning disability    Methamphetamine abuse, episodic ([facility] )      Subjective:Patient was seen today for continuation of care, records reviewed and  patient was discussed with the morning case review team   Priya Ballesteros continues to report difficulty sleeping and reports restless legs while in bed  Requip dose will be started to alleviate current symptoms  He has been out in the day room, socializing with peers, making jokes  Continues to ruminate about his ex-wife as her son has told him that she will be homeless and he is worried  And feels he will still help her financially, as he cares for her  He looks forward to go back to work and was made aware of discharge plan  He has been receptive to the plan and was given brief motivational interviewing  for alcohol abuse  We discussed self medicating with alcohol as the need for medications long term  He has shown improved insight and express willingness to continue them  Patient denies endorsing any suicidal or homicidal ideation  Does not seem to be experiencing any manic or psychotic symptoms during our encounter  Remains medication compliance  Denies any side effects from medications  Tolerating well Depakote and Seroquel increase  On Ativan taper  CIWA 0- VSS  May use Trazodone p r n   if difficulty sleeping        Current Medications:  Current Facility-Administered Medications   Medication Dose Route Frequency    acetaminophen (TYLENOL) tablet 650 mg  650 mg Oral Q6H PRN    atorvastatin (LIPITOR) tablet 40 mg  40 mg Oral Daily    benztropine (COGENTIN) injection 1 mg  1 mg Intramuscular Q6H PRN    benztropine (COGENTIN) tablet 1 mg  1 mg Oral Q6H PRN    benztropine (COGENTIN) tablet 1 mg  1 mg Oral HS    divalproex sodium (DEPAKOTE) EC tablet 1,000 mg  1,000 mg Oral BID    folic acid (FOLVITE) tablet 1 mg  1 mg Oral Daily    haloperidol (HALDOL) tablet 5 mg  5 mg Oral Q8H PRN    haloperidol lactate (HALDOL) injection 5 mg  5 mg Intramuscular Q8H PRN    hydrOXYzine HCL (ATARAX) tablet 25 mg  25 mg Oral Q6H PRN    hydrOXYzine HCL (ATARAX) tablet 50 mg  50 mg Oral Q6H PRN    hydrOXYzine HCL (ATARAX) tablet 75 mg  75 mg Oral Q6H PRN    ibuprofen (MOTRIN) tablet 600 mg  600 mg Oral Q6H PRN    ibuprofen (MOTRIN) tablet 800 mg  800 mg Oral Q8H PRN    LORazepam (ATIVAN) 2 mg/mL injection 1 mg  1 mg Intramuscular Q6H PRN    LORazepam (ATIVAN) tablet 0 5 mg  0 5 mg Oral BID    LORazepam (ATIVAN) tablet 1 mg  1 mg Oral Q6H PRN    LORazepam (ATIVAN) tablet 1 5 mg  1 5 mg Oral Q6H PRN    LORazepam (ATIVAN) tablet 2 mg  2 mg Oral Q6H PRN    magnesium hydroxide (MILK OF MAGNESIA) 400 mg/5 mL oral suspension 20 mL  20 mL Oral Daily PRN    mirtazapine (REMERON) tablet 15 mg  15 mg Oral HS    multivitamin-minerals (CENTRUM) tablet 1 tablet  1 tablet Oral Daily    nicotine (NICODERM CQ) 21 mg/24 hr TD 24 hr patch 1 patch  1 patch Transdermal Daily    nicotine polacrilex (NICORETTE) gum 4 mg  4 mg Oral Q4H PRN    OLANZapine (ZyPREXA ZYDIS) dispersible tablet 10 mg  10 mg Oral Q3H PRN    OLANZapine (ZyPREXA) IM injection 10 mg  10 mg Intramuscular Q3H PRN    QUEtiapine (SEROquel) tablet 400 mg  400 mg Oral HS    risperiDONE (RisperDAL M-TABS) dispersible tablet 1 mg  1 mg Oral Q12H PRN    thiamine (VITAMIN B1) tablet 100 mg  100 mg Oral Daily    traZODone (DESYREL) tablet 50 mg  50 mg Oral HS PRN       Behavioral Health Medications: all current active meds have been reviewed and continue current psychiatric medications  Vitals:  Vitals:    10/25/19 0700   BP: 92/63   Pulse: 81   Resp: 17   Temp: 97 8 °F (36 6 °C)   SpO2:        Laboratory results:    I have personally reviewed all pertinent laboratory/tests results  Most Recent Labs:   Lab Results   Component Value Date    WBC 7 70 10/22/2019    RBC 4 19 (L) 10/22/2019    HGB 14 6 10/22/2019    HCT 43 3 10/22/2019     10/22/2019    RDW 13 0 10/22/2019    NEUTROABS 4 30 10/22/2019    SODIUM 139 10/22/2019    K 4 4 10/22/2019     10/22/2019    CO2 30 10/22/2019    BUN 12 10/22/2019    CREATININE 0 83 10/22/2019    GLUC 109 (H) 10/22/2019    GLUF 109 (H) 10/22/2019    CALCIUM 9 1 10/22/2019    AST 30 10/22/2019    ALT 26 10/22/2019    ALKPHOS 55 10/22/2019    TP 6 5 10/22/2019    ALB 3 7 10/22/2019    TBILI 0 40 10/22/2019    CHOLESTEROL 165 10/19/2019    HDL 42 10/19/2019    TRIG 146 10/19/2019    LDLCALC 94 10/19/2019    [place] 123 10/19/2019    VALPROICTOT 59 2 10/24/2019       Psychiatric Review of Systems:    Behavior over the last 24 hours:  improved     Sleep: normal  Appetite: normal  Medication side effects: No  ROS: Restless legs     Mental Status Evaluation:    Appearance:  casually dressed, dressed appropriately   Behavior:  pleasant, cooperative, calm,  improved eye contact, decreased psychomotor retardation   Speech:  slow, scant, delayed, soft, decreased volume   Mood:  Less depressed, brighten up in approach   Affect:  Less flat, mood-congruent, more reactive   Thought Process:  coherent, goal directed, linear   Associations: intact associations   Thought Content:  no overt delusions, ruminating thoughts   Perceptual Disturbances: no auditory hallucinations, no visual hallucinations   Risk Potential: Suicidal ideation - None at present  Homicidal ideation - None  Potential for aggression - No   Sensorium:  oriented to person, place, time/date and situation   Memory:  recent and remote memory grossly intact   Consciousness:  alert and awake   Attention: attention span and concentration are age appropriate   Insight:  improving   Judgment: improving   Gait/Station: normal gait/station, normal balance   Motor Activity: no abnormal movements       Progress Toward Goals:     Improving    Assessment/Plan   Principal Problem:    Bipolar I disorder, most recent episode depressed, severe with psychotic features ([facility] )  Active Problems:    Post-traumatic stress disorder, chronic    Hyperlipidemia    Uncomplicated alcohol dependence ([facility] )    Cigarette nicotine dependence without complication    Medical clearance for psychiatric admission    Cannabis abuse, episodic    Learning disability    Methamphetamine abuse, episodic ([place] [address] )      Recommended Treatment: Continue with group therapy, milieu therapy and occupational therapy  Treatment plan and medication changes discussed and per the attending physician the plan is: 1  Behavioral Health checks every 7 minutes  2  Continue with current medication regimen  3  Continue with Ativan taper, Seroquel  400 mg daily at HS,  Cogentin 1 mg daily at bedtime, Depakote 1000 mg BID daily, Remeron 15 mg daily at HS and vitamins to supplement deficiencies from alcohol abuse    4  Start Requip 0 5 mg daily at bedtime for restless legs  4  VPA to be checked on Sunday 5  Pending discharge possibly for Tuesday  Risks / Benefits of Treatment:     Risks, benefits, and possible side effects of medications explained to patient and patient verbalizes understanding and agreement for treatment  Counseling / Coordination of Care:     Patient's progress reviewed with nursing staff  Medications, treatment progress and treatment plan reviewed with patient  Supportive counseling provided to the patient            Davy Calderon

## 2019-10-26 PROCEDURE — 99232 SBSQ HOSP IP/OBS MODERATE 35: CPT | Performed by: PSYCHIATRY & NEUROLOGY

## 2019-10-26 RX ADMIN — MIRTAZAPINE 15 MG: 15 TABLET, FILM COATED ORAL at 21:27

## 2019-10-26 RX ADMIN — QUETIAPINE 400 MG: 400 TABLET, FILM COATED ORAL at 21:27

## 2019-10-26 RX ADMIN — LORAZEPAM 0.5 MG: 0.5 TABLET ORAL at 08:26

## 2019-10-26 RX ADMIN — BENZTROPINE MESYLATE 1 MG: 1 TABLET ORAL at 21:27

## 2019-10-26 RX ADMIN — NICOTINE 1 PATCH: 21 PATCH, EXTENDED RELEASE TRANSDERMAL at 08:27

## 2019-10-26 RX ADMIN — ATORVASTATIN CALCIUM 40 MG: 40 TABLET, FILM COATED ORAL at 08:25

## 2019-10-26 RX ADMIN — Medication 1 TABLET: at 08:25

## 2019-10-26 RX ADMIN — FOLIC ACID 1 MG: 1 TABLET ORAL at 08:26

## 2019-10-26 RX ADMIN — DIVALPROEX SODIUM 1000 MG: 500 TABLET, DELAYED RELEASE ORAL at 17:22

## 2019-10-26 RX ADMIN — ROPINIROLE HYDROCHLORIDE 0.5 MG: 1 TABLET, FILM COATED ORAL at 21:27

## 2019-10-26 RX ADMIN — NICOTINE POLACRILEX 4 MG: 2 GUM, CHEWING ORAL at 19:08

## 2019-10-26 RX ADMIN — DIVALPROEX SODIUM 1000 MG: 500 TABLET, DELAYED RELEASE ORAL at 08:26

## 2019-10-26 RX ADMIN — LORAZEPAM 0.5 MG: 0.5 TABLET ORAL at 17:22

## 2019-10-26 RX ADMIN — THIAMINE HCL TAB 100 MG 100 MG: 100 TAB at 08:33

## 2019-10-26 NOTE — PLAN OF CARE
Problem: Depression  Goal: Treatment Goal: Demonstrate behavioral control of depressive symptoms, verbalize feelings of improved mood/affect, and adopt new coping skills prior to discharge  Outcome: Progressing  Goal: Verbalize thoughts and feelings  Description  Interventions:  - Assess and re-assess patient's level of risk   - Engage patient in 1:1 interactions, daily, for a minimum of 15 minutes   - Encourage patient to express feelings, fears, frustrations, hopes   Outcome: Progressing  Goal: Refrain from harming self  Description  Interventions:  - Monitor patient closely, per order   - Supervise medication ingestion, monitor effects and side effects   Outcome: Progressing  Goal: Refrain from isolation  Description  Interventions:  - Develop a trusting relationship   - Encourage socialization   Outcome: Progressing  Goal: Refrain from self-neglect  Outcome: Progressing     Problem: SUBSTANCE USE/ABUSE  Goal: Will have no detox symptoms and will verbalize plan for changing substance-related behavior  Description  INTERVENTIONS:  - Monitor physical status and assess for symptoms of withdrawal  - Administer medication as ordered  - Provide emotional support with 1 on 1 interaction with staff  - Encourage recovery focused program/ addiction education  - Assess for verbalization of changing behaviors related to substance abuse  - Initiate consults and referrals as appropriate (Case Management, Spiritual Care, etc )  -Goal extended on 10/23/2019   Outcome: Progressing  Goal: By discharge, will develop insight into their chemical dependency and sustain motivation to continue in recovery  Description  INTERVENTIONS:  - Attends all daily group sessions and scheduled AA groups  - Actively practices coping skills through participation in the therapeutic community and adherence to program rules  - Reviews and completes assignments from individual treatment plan  - Assist patient development of understanding of their personal cycle of addiction and relapse triggers  -Goal extended on 10/23/2019   Outcome: Progressing  Goal: By discharge, patient will have ongoing treatment plan addressing chemical dependency  Description  INTERVENTIONS:  - Assist patient with resources and/or appointments for ongoing recovery based living  -Goal extended on 10/23/2019   Outcome: Progressing     Problem: Nutrition/Hydration-ADULT  Goal: Nutrient/Hydration intake appropriate for improving, restoring or maintaining nutritional needs  Description  Monitor and assess patient's nutrition/hydration status for malnutrition  Collaborate with interdisciplinary team and initiate plan and interventions as ordered  Monitor patient's weight and dietary intake as ordered or per policy  Utilize nutrition screening tool and intervene as necessary  Determine patient's food preferences and provide high-protein, high-caloric foods as appropriate       INTERVENTIONS:  - Monitor oral intake, urinary output, labs, and treatment plans  - Assess nutrition and hydration status and recommend course of action  - Evaluate amount of meals eaten  - Assist patient with eating if necessary   - Allow adequate time for meals  - Recommend/ encourage appropriate diets, oral nutritional supplements, and vitamin/mineral supplements  - Order, calculate, and assess calorie counts as needed  - Recommend, monitor, and adjust tube feedings and TPN/PPN based on assessed needs  - Assess need for intravenous fluids  - Provide specific nutrition/hydration education as appropriate  - Include patient/family/caregiver in decisions related to nutrition  Outcome: Progressing     Problem: DISCHARGE PLANNING  Goal: Discharge to home or other facility with appropriate resources  Description  INTERVENTIONS:  - Identify barriers to discharge w/patient and caregiver  - Arrange for needed discharge resources and transportation as appropriate  - Identify discharge learning needs (meds, wound care, etc )  - Refer to Case Management Department for coordinating discharge planning if the patient needs post-hospital services based on physician/advanced practitioner order or complex needs related to functional status, cognitive ability, or social support system   Outcome: Progressing     Problem: Ineffective Coping  Goal: Participates in unit activities  Description  Interventions:  - Provide therapeutic environment   - Provide required programming   - Redirect inappropriate behaviors   Outcome: Not Progressing

## 2019-10-26 NOTE — PROGRESS NOTES
Patient has been throwing shampoo bottles and paper towels in the toilet  Patient clogged the toilet multiple times  Patients bathroom door was locked and patient instructed to ask for staff to open the door when needed  Patient then proceeded to urinate on his bed, clothes and floor and did not tell staff he needed to use the bathroom

## 2019-10-26 NOTE — PROGRESS NOTES
Contact with client and assessment reveals no SI, HI, Auditory, Visual, or command hallucinations  Client expresses sadness, intentional withdrawal from the milieu and feeling depressed  Client says he wants to avoid triggers that will set him off  Client has poor eye contact,and flat a affect  He reminisced extensively about situations where he either attempted suicide or where he was in instances where his life was in danger  Encouraging reinforcement and redirection was provided  Client states that he wishes that his drug regimen were altered to reflect different times  States "between 4pm -6pm I am more anxious like the meds are wearing off"  Client continues to express his intention to drink as soon as he gets out, but will try to drink less  He admits it's a wrong way of coping

## 2019-10-26 NOTE — PROGRESS NOTES
Progress Note - Behavioral Health   Jose Barron 48 y o  male MRN: [id number]  Unit/Bed#: [facility] 352-01 Encounter: [id number]  Staff reported still labile, blood pressures have been improving  When I met with patient he talked at length about what happened prior to admission we talked about what he needs to do in order to continue to make improvements and he has agreed to outpatient therapy and he knows he cannot afford to drink  Behavior over the last 24 hours:  Improving  Sleep:  Improving  Appetite: normal  Medication side effects: No  ROS: anxiety    Medications:   Current Facility-Administered Medications   Medication Dose Route Frequency Provider Last Rate Last Dose    acetaminophen (TYLENOL) tablet 650 mg  650 mg Oral Q6H PRN LUTHER [place]        atorvastatin (LIPITOR) tablet 40 mg  40 mg Oral Daily LUTHER [place]   40 mg at 10/26/19 0825    benztropine (COGENTIN) injection 1 mg  1 mg Intramuscular Q6H PRN Schuyler Tinajero MD        benztropine (COGENTIN) tablet 1 mg  1 mg Oral Q6H PRN Schuyler Tinajero MD   1 mg at 10/19/19 1932    benztropine (COGENTIN) tablet 1 mg  1 mg Oral HS LUTHER Antony   1 mg at 10/25/19 2109    divalproex sodium (DEPAKOTE) EC tablet 1,000 mg  1,000 mg Oral BID LUTHER Antony   1,000 mg at 75/61/51 4940    folic acid (FOLVITE) tablet 1 mg  1 mg Oral Daily Leticia Linton DO   1 mg at 10/26/19 0826    haloperidol (HALDOL) tablet 5 mg  5 mg Oral Q8H PRN Jose James MD   5 mg at 10/19/19 1932    haloperidol lactate (HALDOL) injection 5 mg  5 mg Intramuscular Q8H PRN Schuyler Tinajero MD        hydrOXYzine HCL (ATARAX) tablet 25 mg  25 mg Oral Q6H PRN Schuyler Tinajero MD        hydrOXYzine HCL (ATARAX) tablet 50 mg  50 mg Oral Q6H PRN Schuyler Tinajero MD   50 mg at 10/24/19 1312    hydrOXYzine HCL (ATARAX) tablet 75 mg  75 mg Oral Q6H PRN Schuyler Tinajero MD   75 mg at 10/21/19 1153    ibuprofen (MOTRIN) tablet 600 mg 600 mg Oral Q6H PRN Danii Espino MD        ibuprofen (MOTRIN) tablet 800 mg  800 mg Oral Q8H PRN Danii Espino MD        LORazepam (ATIVAN) 2 mg/mL injection 1 mg  1 mg Intramuscular Q6H PRN Danii Espino MD        LORazepam (ATIVAN) tablet 0 5 mg  0 5 mg Oral BID Danii Espino MD   0 5 mg at 10/26/19 4962    LORazepam (ATIVAN) tablet 1 mg  1 mg Oral Q6H PRN Danii Espino MD   1 mg at 10/25/19 1548    LORazepam (ATIVAN) tablet 1 5 mg  1 5 mg Oral Q6H PRN Danii Espino MD        LORazepam (ATIVAN) tablet 2 mg  2 mg Oral Q6H PRN Danii Espino MD        magnesium hydroxide (MILK OF MAGNESIA) 400 mg/5 mL oral suspension 20 mL  20 mL Oral Daily PRN Adriana Child MD        mirtazapine (REMERON) tablet 15 mg  15 mg Oral HS Danii Espino MD   15 mg at 10/25/19 2108    multivitamin-minerals (CENTRUM) tablet 1 tablet  1 tablet Oral Daily Leticia L Shaila, DO   1 tablet at 10/26/19 0825    nicotine (NICODERM CQ) 21 mg/24 hr TD 24 hr patch 1 patch  1 patch Transdermal Daily Sylvie Salazar MD   1 patch at 10/26/19 0827    nicotine polacrilex (NICORETTE) gum 4 mg  4 mg Oral Q4H PRN Mirna Euceda CRNP   4 mg at 10/25/19 2111    OLANZapine (ZyPREXA ZYDIS) dispersible tablet 10 mg  10 mg Oral Q3H PRN Danii Espino MD        OLANZapine (ZyPREXA) IM injection 10 mg  10 mg Intramuscular Q3H PRN Danii Espino MD        QUEtiapine (SEROquel) tablet 400 mg  400 mg Oral HS Mirna Euceda, CRNP   400 mg at 10/25/19 2108    risperiDONE (RisperDAL M-TABS) dispersible tablet 1 mg  1 mg Oral Q12H PRN Adriana Child MD        rOPINIRole (REQUIP) tablet 0 5 mg  0 5 mg Oral HS Mirna Euceda, CRNP   0 5 mg at 10/25/19 2108    thiamine (VITAMIN B1) tablet 100 mg  100 mg Oral Daily Leticia L Bendock, DO   100 mg at 10/26/19 9185    traZODone (DESYREL) tablet 50 mg  50 mg Oral HS PRN Danii Espino MD         Medications Prior to Admission   Medication    atorvastatin (LIPITOR) 40 mg tablet    citalopram (CeleXA) 20 mg tablet    divalproex sodium (DEPAKOTE) 500 mg EC tablet    QUEtiapine (SEROquel) 300 mg tablet       Labs:   Admission on 10/17/2019   Component Date Value    WBC 10/17/2019 5 00     RBC 10/17/2019 4 13*    Hemoglobin 10/17/2019 14 6     Hematocrit 10/17/2019 42 6     MCV 10/17/2019 103*    MCH 10/17/2019 35 3*    MCHC 10/17/2019 34 2     RDW 10/17/2019 12 8     MPV 10/17/2019 8 5*    Platelets 13/98/0872 284     Neutrophils Relative 10/17/2019 46     Lymphocytes Relative 10/17/2019 44     Monocytes Relative 10/17/2019 7     Eosinophils Relative 10/17/2019 3     Basophils Relative 10/17/2019 1     Neutrophils Absolute 10/17/2019 2 30     Lymphocytes Absolute 10/17/2019 2 20     Monocytes Absolute 10/17/2019 0 30     Eosinophils Absolute 10/17/2019 0 10     Basophils Absolute 10/17/2019 0 00     Sodium 10/17/2019 142     Potassium 10/17/2019 4 1     Chloride 10/17/2019 107     CO2 10/17/2019 29     ANION GAP 10/17/2019 6     BUN 10/17/2019 8     Creatinine 10/17/2019 0 74     Glucose 10/17/2019 94     Calcium 10/17/2019 8 7     AST 10/17/2019 32     ALT 10/17/2019 34     Alkaline Phosphatase 10/17/2019 62     Total Protein 10/17/2019 6 8     Albumin 10/17/2019 4 0     Total Bilirubin 10/17/2019 0 40     eGFR 10/17/2019 106     EXTBreath Alcohol 10/17/2019 0 066     Color, UA 10/17/2019 [place]*    Clarity, UA 10/17/2019 Clear     Specific Gravity, UA 10/17/2019 1 020     pH, UA 10/17/2019 5 0     Leukocytes, UA 10/17/2019 100 0*    Nitrite, UA 10/17/2019 Negative     Protein, UA 10/17/2019 Negative     Glucose, UA 10/17/2019 Negative     Ketones, UA 10/17/2019 5 (Trace)*    Bilirubin, UA 10/17/2019 Negative     Blood, UA 10/17/2019 Negative     UROBILINOGEN UA 10/17/2019 Negative     Amph/Meth UR 10/17/2019 Positive*    Barbiturate Ur 10/17/2019 Negative     Benzodiazepine Urine 10/17/2019 Negative  Cocaine Urine 10/17/2019 Negative     Methadone Urine 10/17/2019 Negative     Opiate Urine 10/17/2019 Negative     PCP Ur 10/17/2019 Negative     THC Urine 10/17/2019 Negative     Magnesium 10/17/2019 2 0     RBC, UA 10/17/2019 0-1*    WBC, UA 10/17/2019 10-20*    Epithelial Cells 10/17/2019 Occasional     Bacteria, UA 10/17/2019 Occasional     MUCUS THREADS 10/17/2019 Innumerable*    RBC Morphology 10/17/2019 abnormal     Macrocytes 10/17/2019 Present     Platelet Estimate 85/46/3827 Adequate     Urine Culture 10/17/2019 <10,000 cfu/ml      EXTBreath Alcohol 10/17/2019 0 089     Ventricular Rate 10/17/2019 82     Atrial Rate 10/17/2019 82     MD Interval 10/17/2019 146     QRSD Interval 10/17/2019 94     QT Interval 10/17/2019 370     QTC Interval 10/17/2019 432     P Axis 10/17/2019 60     QRS Axis 10/17/2019 56     T Wave Axis 10/17/2019 51     Cholesterol 10/19/2019 165     Triglycerides 10/19/2019 146     HDL, Direct 10/19/2019 42     LDL Calculated 10/19/2019 94     Non-HDL-Chol (CHOL-HDL) 10/19/2019 123     WBC 10/22/2019 7 70     RBC 10/22/2019 4 19*    Hemoglobin 10/22/2019 14 6     Hematocrit 10/22/2019 43 3     MCV 10/22/2019 103*    MCH 10/22/2019 34 7*    MCHC 10/22/2019 33 6     RDW 10/22/2019 13 0     MPV 10/22/2019 8 4*    Platelets 53/41/4638 273     Neutrophils Relative 10/22/2019 56     Lymphocytes Relative 10/22/2019 33     Monocytes Relative 10/22/2019 7     Eosinophils Relative 10/22/2019 3     Basophils Relative 10/22/2019 1     Neutrophils Absolute 10/22/2019 4 30     Lymphocytes Absolute 10/22/2019 2 50     Monocytes Absolute 10/22/2019 0 60     Eosinophils Absolute 10/22/2019 0 20     Basophils Absolute 10/22/2019 0 10     Sodium 10/22/2019 139     Potassium 10/22/2019 4 4     Chloride 10/22/2019 105     CO2 10/22/2019 30     ANION GAP 10/22/2019 4*    BUN 10/22/2019 12     Creatinine 10/22/2019 0 83     Glucose 10/22/2019 109*  Glucose, Fasting 10/22/2019 109*    Calcium 10/22/2019 9 1     AST 10/22/2019 30     ALT 10/22/2019 26     Alkaline Phosphatase 10/22/2019 55     Total Protein 10/22/2019 6 5     Albumin 10/22/2019 3 7     Total Bilirubin 10/22/2019 0 40     eGFR 10/22/2019 101     Valproic Acid, Total 10/22/2019 62 9     RBC Morphology 10/22/2019 abnormal     Macrocytes 10/22/2019 Present     Platelet Estimate 55/95/2998 Adequate     Valproic Acid, Total 10/24/2019 59 2        Mental Status Evaluation:  Appearance:  casually dressed   Behavior:  Cooperative   Speech:  Tends to speak fast   Mood:  anxious and depressed   Affect:  Less depressed   Associations: intact associations   Thought Process:  goal directed   Thought Content:  No overt delusions   Perceptual Disturbances: Denied auditory and visual hallucinations   Risk Potential: Denies suicidal homicidal thoughts and plans   Sensorium:  person and place   Memory recent and remote memory grossly intact   Consciousness:  alert    Attention: attention span and concentration were age appropriate   Insight:  Improving   Judgment: Improving   Gait/Station: normal gait/station   Motor Activity: no abnormal movements     Progress Toward Goals:  Patient has been interacting with peers and staff he did mention that it is important to him not to be sedated with his medications, 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 [address] )  Active Problems:    Post-traumatic stress disorder, chronic    Hyperlipidemia    Uncomplicated alcohol dependence ([facility] [address] )    Cigarette nicotine dependence without complication    Medical clearance for psychiatric admission    Cannabis abuse, episodic    Learning disability    Methamphetamine abuse, episodic ([facility] [address] )    Medications:  Cogentin 1 mg at bedtime  Depakote a 1000 mg twice a day  Mirtazapine 15 mg at bedtime  Seroquel 400 mg at bedtime  Requip 0 5 mg at bedtime  Depakote level on 10/24   59 2  Recommended Treatment: Continue with group therapy, milieu therapy and occupational therapy  Risks, benefits and possible side effects of Medications:   Risks, benefits, and possible side effects of medications explained to patient and patient verbalizes understanding  Counseling / Coordination of Care  Total floor / unit time spent today 25 minutes  Greater than 50% of total time was spent with the patient and / or family counseling and / or coordination of care  A description of the counseling / coordination of care:   We discussed how to prevent relapses, discussed pharmacotherapy and supportive psychotherapy

## 2019-10-26 NOTE — PROGRESS NOTES
Patient denies all symptoms  He is seclusive to his room, tends to be withdrawn, but is much brighter upon approach  He states he is feeling better and is "ready to go"  Patient denies any alcohol withdrawal symptoms  Will continue to monitor

## 2019-10-26 NOTE — PROGRESS NOTES
Patient currently denies all symptoms, "just a little depression " Patient has been seclusive to room, resting, does not socialize with others  He is cooperative otherwise  No tearfulness noted  He was quiet and polite with RN  He denies any alcohol withdrawal symptoms  Will monitor

## 2019-10-27 LAB — VALPROATE SERPL-MCNC: 67.4 UG/ML (ref 50–120)

## 2019-10-27 PROCEDURE — 99232 SBSQ HOSP IP/OBS MODERATE 35: CPT | Performed by: PSYCHIATRY & NEUROLOGY

## 2019-10-27 PROCEDURE — 80164 ASSAY DIPROPYLACETIC ACD TOT: CPT | Performed by: NURSE PRACTITIONER

## 2019-10-27 RX ADMIN — MIRTAZAPINE 15 MG: 15 TABLET, FILM COATED ORAL at 21:08

## 2019-10-27 RX ADMIN — ROPINIROLE HYDROCHLORIDE 0.5 MG: 1 TABLET, FILM COATED ORAL at 21:08

## 2019-10-27 RX ADMIN — HYDROXYZINE HYDROCHLORIDE 50 MG: 50 TABLET ORAL at 19:36

## 2019-10-27 RX ADMIN — QUETIAPINE 400 MG: 400 TABLET, FILM COATED ORAL at 21:08

## 2019-10-27 RX ADMIN — LORAZEPAM 0.5 MG: 0.5 TABLET ORAL at 08:27

## 2019-10-27 RX ADMIN — THIAMINE HCL TAB 100 MG 100 MG: 100 TAB at 08:26

## 2019-10-27 RX ADMIN — ATORVASTATIN CALCIUM 40 MG: 40 TABLET, FILM COATED ORAL at 08:27

## 2019-10-27 RX ADMIN — FOLIC ACID 1 MG: 1 TABLET ORAL at 08:27

## 2019-10-27 RX ADMIN — DIVALPROEX SODIUM 1000 MG: 500 TABLET, DELAYED RELEASE ORAL at 17:18

## 2019-10-27 RX ADMIN — BENZTROPINE MESYLATE 1 MG: 1 TABLET ORAL at 21:08

## 2019-10-27 RX ADMIN — NICOTINE POLACRILEX 4 MG: 2 GUM, CHEWING ORAL at 11:32

## 2019-10-27 RX ADMIN — NICOTINE 1 PATCH: 21 PATCH, EXTENDED RELEASE TRANSDERMAL at 08:28

## 2019-10-27 RX ADMIN — DIVALPROEX SODIUM 1000 MG: 500 TABLET, DELAYED RELEASE ORAL at 08:27

## 2019-10-27 RX ADMIN — Medication 1 TABLET: at 08:27

## 2019-10-27 NOTE — PROGRESS NOTES
Met with client in his room  Client cooperative, and soft spoken  Denies SI, HI, Hallucinations, Auditory, Visual, command  Client c/o tiredness, and sadness regarding going off and calling other 2 other clients "bitches" while waiting for food  Client continues to express that he will drink immediately upon d/c  Client will complete ADL's later states "its too early"

## 2019-10-27 NOTE — PROGRESS NOTES
Per staff, client called 2 other clients "little bitches" repeatedly  because of jumping the vitals line

## 2019-10-27 NOTE — PROGRESS NOTES
Pt denies all symptoms except anxiety  Pt has been more visible in the milieu  He has been cooperative  He has been mostly superficial in conversation  Will monitor

## 2019-10-27 NOTE — PLAN OF CARE
Problem: Depression  Goal: Treatment Goal: Demonstrate behavioral control of depressive symptoms, verbalize feelings of improved mood/affect, and adopt new coping skills prior to discharge  Outcome: Progressing  Goal: Verbalize thoughts and feelings  Description  Interventions:  - Assess and re-assess patient's level of risk   - Engage patient in 1:1 interactions, daily, for a minimum of 15 minutes   - Encourage patient to express feelings, fears, frustrations, hopes   Outcome: Progressing  Goal: Refrain from harming self  Description  Interventions:  - Monitor patient closely, per order   - Supervise medication ingestion, monitor effects and side effects   Outcome: Progressing  Goal: Refrain from isolation  Description  Interventions:  - Develop a trusting relationship   - Encourage socialization   Outcome: Progressing  Goal: Refrain from self-neglect  Outcome: Progressing     Problem: SUBSTANCE USE/ABUSE  Goal: Will have no detox symptoms and will verbalize plan for changing substance-related behavior  Description  INTERVENTIONS:  - Monitor physical status and assess for symptoms of withdrawal  - Administer medication as ordered  - Provide emotional support with 1 on 1 interaction with staff  - Encourage recovery focused program/ addiction education  - Assess for verbalization of changing behaviors related to substance abuse  - Initiate consults and referrals as appropriate (Case Management, Spiritual Care, etc )  -Goal extended on 10/23/2019   Outcome: Progressing  Goal: By discharge, will develop insight into their chemical dependency and sustain motivation to continue in recovery  Description  INTERVENTIONS:  - Attends all daily group sessions and scheduled AA groups  - Actively practices coping skills through participation in the therapeutic community and adherence to program rules  - Reviews and completes assignments from individual treatment plan  - Assist patient development of understanding of their personal cycle of addiction and relapse triggers  -Goal extended on 10/23/2019   Outcome: Progressing  Goal: By discharge, patient will have ongoing treatment plan addressing chemical dependency  Description  INTERVENTIONS:  - Assist patient with resources and/or appointments for ongoing recovery based living  -Goal extended on 10/23/2019   Outcome: Progressing     Problem: Nutrition/Hydration-ADULT  Goal: Nutrient/Hydration intake appropriate for improving, restoring or maintaining nutritional needs  Description  Monitor and assess patient's nutrition/hydration status for malnutrition  Collaborate with interdisciplinary team and initiate plan and interventions as ordered  Monitor patient's weight and dietary intake as ordered or per policy  Utilize nutrition screening tool and intervene as necessary  Determine patient's food preferences and provide high-protein, high-caloric foods as appropriate       INTERVENTIONS:  - Monitor oral intake, urinary output, labs, and treatment plans  - Assess nutrition and hydration status and recommend course of action  - Evaluate amount of meals eaten  - Assist patient with eating if necessary   - Allow adequate time for meals  - Recommend/ encourage appropriate diets, oral nutritional supplements, and vitamin/mineral supplements  - Order, calculate, and assess calorie counts as needed  - Recommend, monitor, and adjust tube feedings and TPN/PPN based on assessed needs  - Assess need for intravenous fluids  - Provide specific nutrition/hydration education as appropriate  - Include patient/family/caregiver in decisions related to nutrition  Outcome: Progressing     Problem: DISCHARGE PLANNING  Goal: Discharge to home or other facility with appropriate resources  Description  INTERVENTIONS:  - Identify barriers to discharge w/patient and caregiver  - Arrange for needed discharge resources and transportation as appropriate  - Identify discharge learning needs (meds, wound care, etc )  - Refer to Case Management Department for coordinating discharge planning if the patient needs post-hospital services based on physician/advanced practitioner order or complex needs related to functional status, cognitive ability, or social support system   Outcome: Progressing     Problem: Ineffective Coping  Goal: Participates in unit activities  Description  Interventions:  - Provide therapeutic environment   - Provide required programming   - Redirect inappropriate behaviors   Outcome: Progressing

## 2019-10-27 NOTE — PROGRESS NOTES
Progress Note - Behavioral Health   Cecily Che 48 y o  male MRN: [id number]  Unit/Bed#: [facility] 352-01 Encounter: [id number]     Staff reported still labile, got irritated this morning, but could regroup for the rest of the day  When I met with patient he talked more  about what happened prior to admission  Stated he has been depressed for many years, since childhood  We talked at length about what he needs to do in order to continue to make progress  PT today more realistic about his future  Behavior over the last 24 hours:  Improving  Sleep:  Improving  Appetite: normal  Medication side effects: No  ROS: anxiety    Medications:   Current Facility-Administered Medications   Medication Dose Route Frequency Provider Last Rate Last Dose    acetaminophen (TYLENOL) tablet 650 mg  650 mg Oral Q6H PRN LUTHER Harkins        atorvastatin (LIPITOR) tablet 40 mg  40 mg Oral Daily LUTHER Murcia   40 mg at 10/27/19 0827    benztropine (COGENTIN) injection 1 mg  1 mg Intramuscular Q6H PRN Sindy Aguillon MD        benztropine (COGENTIN) tablet 1 mg  1 mg Oral Q6H PRN Sindy Aguillon MD   1 mg at 10/19/19 1932    benztropine (COGENTIN) tablet 1 mg  1 mg Oral HS LUTHER Antony   1 mg at 10/26/19 2127    divalproex sodium (DEPAKOTE) EC tablet 1,000 mg  1,000 mg Oral BID PRITI AntonyNP   1,000 mg at 54/11/32 8385    folic acid (FOLVITE) tablet 1 mg  1 mg Oral Daily Leticia Linton DO   1 mg at 10/27/19 0827    haloperidol (HALDOL) tablet 5 mg  5 mg Oral Q8H PRN Haim Marroquin MD   5 mg at 10/19/19 1932    haloperidol lactate (HALDOL) injection 5 mg  5 mg Intramuscular Q8H PRN Sindy Aguillon MD        hydrOXYzine HCL (ATARAX) tablet 25 mg  25 mg Oral Q6H PRN Sindy Aguillon MD        hydrOXYzine HCL (ATARAX) tablet 50 mg  50 mg Oral Q6H PRN Sindy Aguillon MD   50 mg at 10/24/19 1312    hydrOXYzine HCL (ATARAX) tablet 75 mg  75 mg Oral Q6H PRN [place] Lashon Nicholson MD   75 mg at 10/21/19 1153    ibuprofen (MOTRIN) tablet 600 mg  600 mg Oral Q6H PRN Marcello Curry MD        ibuprofen (MOTRIN) tablet 800 mg  800 mg Oral Q8H PRN Marcello Curry MD        LORazepam (ATIVAN) 2 mg/mL injection 1 mg  1 mg Intramuscular Q6H PRN Marcello Curry MD        LORazepam (ATIVAN) tablet 1 mg  1 mg Oral Q6H PRN Marcello Curry MD   1 mg at 10/25/19 1548    LORazepam (ATIVAN) tablet 1 5 mg  1 5 mg Oral Q6H PRN Marcello Curry MD        LORazepam (ATIVAN) tablet 2 mg  2 mg Oral Q6H PRN Marcello Curry MD        magnesium hydroxide (MILK OF MAGNESIA) 400 mg/5 mL oral suspension 20 mL  20 mL Oral Daily PRN Jessica Camacho MD        mirtazapine (REMERON) tablet 15 mg  15 mg Oral HS Marcello Curry MD   15 mg at 10/26/19 2127    multivitamin-minerals (CENTRUM) tablet 1 tablet  1 tablet Oral Daily Leticia L Shaila, DO   1 tablet at 10/27/19 0827    nicotine (NICODERM CQ) 21 mg/24 hr TD 24 hr patch 1 patch  1 patch Transdermal Daily Pepper Rice MD   1 patch at 10/27/19 0828    nicotine polacrilex (NICORETTE) gum 4 mg  4 mg Oral Q4H PRN PRITI AntonyNP   4 mg at 10/26/19 1908    OLANZapine (ZyPREXA ZYDIS) dispersible tablet 10 mg  10 mg Oral Q3H PRN Marcello Curry MD        OLANZapine (ZyPREXA) IM injection 10 mg  10 mg Intramuscular Q3H PRN Marcello Curry MD        QUEtiapine (SEROquel) tablet 400 mg  400 mg Oral HS PRITI AntonyNP   400 mg at 10/26/19 2127    risperiDONE (RisperDAL M-TABS) dispersible tablet 1 mg  1 mg Oral Q12H PRN Jessica Camacho MD        rOPINIRole (REQUIP) tablet 0 5 mg  0 5 mg Oral HS PRITI AntonyNP   0 5 mg at 10/26/19 2127    thiamine (VITAMIN B1) tablet 100 mg  100 mg Oral Daily Leticia L DO Shaila   100 mg at 10/27/19 1200    traZODone (DESYREL) tablet 50 mg  50 mg Oral HS PRN Marcello Curry MD         Medications Prior to Admission   Medication    atorvastatin (LIPITOR) 40 mg tablet    citalopram (CeleXA) 20 mg tablet    divalproex sodium (DEPAKOTE) 500 mg EC tablet    QUEtiapine (SEROquel) 300 mg tablet       Labs:   Admission on 10/17/2019   Component Date Value    WBC 10/17/2019 5 00     RBC 10/17/2019 4 13*    Hemoglobin 10/17/2019 14 6     Hematocrit 10/17/2019 42 6     MCV 10/17/2019 103*    MCH 10/17/2019 35 3*    MCHC 10/17/2019 34 2     RDW 10/17/2019 12 8     MPV 10/17/2019 8 5*    Platelets 28/82/7211 284     Neutrophils Relative 10/17/2019 46     Lymphocytes Relative 10/17/2019 44     Monocytes Relative 10/17/2019 7     Eosinophils Relative 10/17/2019 3     Basophils Relative 10/17/2019 1     Neutrophils Absolute 10/17/2019 2 30     Lymphocytes Absolute 10/17/2019 2 20     Monocytes Absolute 10/17/2019 0 30     Eosinophils Absolute 10/17/2019 0 10     Basophils Absolute 10/17/2019 0 00     Sodium 10/17/2019 142     Potassium 10/17/2019 4 1     Chloride 10/17/2019 107     CO2 10/17/2019 29     ANION GAP 10/17/2019 6     BUN 10/17/2019 8     Creatinine 10/17/2019 0 74     Glucose 10/17/2019 94     Calcium 10/17/2019 8 7     AST 10/17/2019 32     ALT 10/17/2019 34     Alkaline Phosphatase 10/17/2019 62     Total Protein 10/17/2019 6 8     Albumin 10/17/2019 4 0     Total Bilirubin 10/17/2019 0 40     eGFR 10/17/2019 106     EXTBreath Alcohol 10/17/2019 0 066     Color, UA 10/17/2019 Belkys*    Clarity, UA 10/17/2019 Clear     Specific Gravity, UA 10/17/2019 1 020     pH, UA 10/17/2019 5 0     Leukocytes, UA 10/17/2019 100 0*    Nitrite, UA 10/17/2019 Negative     Protein, UA 10/17/2019 Negative     Glucose, UA 10/17/2019 Negative     Ketones, UA 10/17/2019 5 (Trace)*    Bilirubin, UA 10/17/2019 Negative     Blood, UA 10/17/2019 Negative     UROBILINOGEN UA 10/17/2019 Negative     Amph/Meth UR 10/17/2019 Positive*    Barbiturate Ur 10/17/2019 Negative     Benzodiazepine Urine 10/17/2019 Negative     Cocaine Urine 10/17/2019 Negative     Methadone Urine 10/17/2019 Negative     Opiate Urine 10/17/2019 Negative     PCP Ur 10/17/2019 Negative     THC Urine 10/17/2019 Negative     Magnesium 10/17/2019 2 0     RBC, UA 10/17/2019 0-1*    WBC, UA 10/17/2019 10-20*    Epithelial Cells 10/17/2019 Occasional     Bacteria, UA 10/17/2019 Occasional     MUCUS THREADS 10/17/2019 Innumerable*    RBC Morphology 10/17/2019 abnormal     Macrocytes 10/17/2019 Present     Platelet Estimate 57/06/5938 Adequate     Urine Culture 10/17/2019 <10,000 cfu/ml      EXTBreath Alcohol 10/17/2019 0 089     Ventricular Rate 10/17/2019 82     Atrial Rate 10/17/2019 82     VT Interval 10/17/2019 146     QRSD Interval 10/17/2019 94     QT Interval 10/17/2019 370     QTC Interval 10/17/2019 432     P Axis 10/17/2019 60     QRS Axis 10/17/2019 56     T Wave Axis 10/17/2019 51     Cholesterol 10/19/2019 165     Triglycerides 10/19/2019 146     HDL, Direct 10/19/2019 42     LDL Calculated 10/19/2019 94     Non-HDL-Chol (CHOL-HDL) 10/19/2019 123     WBC 10/22/2019 7 70     RBC 10/22/2019 4 19*    Hemoglobin 10/22/2019 14 6     Hematocrit 10/22/2019 43 3     MCV 10/22/2019 103*    MCH 10/22/2019 34 7*    MCHC 10/22/2019 33 6     RDW 10/22/2019 13 0     MPV 10/22/2019 8 4*    Platelets 08/21/0166 273     Neutrophils Relative 10/22/2019 56     Lymphocytes Relative 10/22/2019 33     Monocytes Relative 10/22/2019 7     Eosinophils Relative 10/22/2019 3     Basophils Relative 10/22/2019 1     Neutrophils Absolute 10/22/2019 4 30     Lymphocytes Absolute 10/22/2019 2 50     Monocytes Absolute 10/22/2019 0 60     Eosinophils Absolute 10/22/2019 0 20     Basophils Absolute 10/22/2019 0 10     Sodium 10/22/2019 139     Potassium 10/22/2019 4 4     Chloride 10/22/2019 105     CO2 10/22/2019 30     ANION GAP 10/22/2019 4*    BUN 10/22/2019 12     Creatinine 10/22/2019 0 83     Glucose 10/22/2019 109*    Glucose, Fasting 10/22/2019 109*    Calcium 10/22/2019 9 1     AST 10/22/2019 30     ALT 10/22/2019 26     Alkaline Phosphatase 10/22/2019 55     Total Protein 10/22/2019 6 5     Albumin 10/22/2019 3 7     Total Bilirubin 10/22/2019 0 40     eGFR 10/22/2019 101     Valproic Acid, Total 10/22/2019 62 9     RBC Morphology 10/22/2019 abnormal     Macrocytes 10/22/2019 Present     Platelet Estimate 10/76/8251 Adequate     Valproic Acid, Total 10/24/2019 59 2     Valproic Acid, Total 10/27/2019 67 4        Mental Status Evaluation: Reviewed Mental status and mostly intact  Appearance:  casually dressed   Behavior:  Cooperative   Speech:  Speech more normal rate and rthythm   Mood:  Less anxious and less depressed   Affect:  congruent with mood  Associations: intact associations   Thought Process:  goal directed   Thought Content:  No overt delusions   Perceptual Disturbances: Denied auditory and visual hallucinations   Risk Potential: Denies suicidal homicidal thoughts and plans   Sensorium:  person and place   Memory recent and remote memory grossly intact   Consciousness:  alert    Attention: attention span and concentration were age appropriate   Insight:  Improving   Judgment: Improving   Gait/Station: normal gait/station   Motor Activity: no abnormal movements     Progress Toward Goals:  Patient has been interacting with peers and staff  He hopes can go back to work soon  I encouraged PT to discuss medications with Psychiatrist   PT has a job that requires a lot of precision  And can not have side effects  Getting less irritated      Assessment/Plan   Principal Problem:    Bipolar I disorder, most recent episode depressed, severe with psychotic features ([facility] )  Active Problems:    Post-traumatic stress disorder, chronic    Hyperlipidemia    Uncomplicated alcohol dependence ([facility] )    Cigarette nicotine dependence without complication    Medical clearance for psychiatric admission    Cannabis abuse, episodic    Learning disability    Methamphetamine abuse, episodic (HCC)    Medications:  Cogentin 1 mg at bedtime  Depakote a 1000 mg twice a day  Mirtazapine 15 mg at bedtime  Seroquel 400 mg at bedtime  Requip 0 5 mg at bedtime  Depakote level on 10/24   59 2  Depakote on 10/27            67 4  Recommended Treatment: Continue with group therapy, milieu therapy and occupational therapy  Risks, benefits and possible side effects of Medications:   Risks, benefits, and possible side effects of medications explained to patient and patient verbalizes understanding  Counseling / Coordination of Care  Total floor / unit time spent today 25 minutes  Greater than 50% of total time was spent with the patient and / or family counseling and / or coordination of care  A description of the counseling / coordination of care:   We discussed how to prevent relapses, discussed pharmacotherapy and supportive psychotherapy

## 2019-10-28 PROBLEM — F43.12 POST-TRAUMATIC STRESS DISORDER, CHRONIC: Chronic | Status: RESOLVED | Noted: 2017-11-01 | Resolved: 2019-10-28

## 2019-10-28 PROBLEM — Z00.8 MEDICAL CLEARANCE FOR PSYCHIATRIC ADMISSION: Status: RESOLVED | Noted: 2019-10-18 | Resolved: 2019-10-28

## 2019-10-28 PROCEDURE — 99232 SBSQ HOSP IP/OBS MODERATE 35: CPT | Performed by: PSYCHIATRY & NEUROLOGY

## 2019-10-28 RX ORDER — QUETIAPINE FUMARATE 50 MG/1
50 TABLET, FILM COATED ORAL DAILY
Status: DISCONTINUED | OUTPATIENT
Start: 2019-10-28 | End: 2019-10-29 | Stop reason: HOSPADM

## 2019-10-28 RX ORDER — QUETIAPINE FUMARATE 50 MG/1
350 TABLET, FILM COATED ORAL
Qty: 210 TABLET | Refills: 1 | Status: SHIPPED | OUTPATIENT
Start: 2019-10-29

## 2019-10-28 RX ORDER — QUETIAPINE FUMARATE 50 MG/1
50 TABLET, FILM COATED ORAL DAILY
Qty: 30 TABLET | Refills: 1 | Status: SHIPPED | OUTPATIENT
Start: 2019-10-29

## 2019-10-28 RX ORDER — FOLIC ACID 1 MG/1
1 TABLET ORAL DAILY
Qty: 30 TABLET | Refills: 1 | Status: SHIPPED | OUTPATIENT
Start: 2019-10-29

## 2019-10-28 RX ORDER — ROPINIROLE 0.5 MG/1
0.5 TABLET, FILM COATED ORAL
Qty: 30 TABLET | Refills: 1 | Status: SHIPPED | OUTPATIENT
Start: 2019-10-29

## 2019-10-28 RX ORDER — BENZTROPINE MESYLATE 1 MG/1
1 TABLET ORAL
Qty: 30 TABLET | Refills: 1 | Status: SHIPPED | OUTPATIENT
Start: 2019-10-29

## 2019-10-28 RX ORDER — DIVALPROEX SODIUM 500 MG/1
1000 TABLET, DELAYED RELEASE ORAL 2 TIMES DAILY
Qty: 120 TABLET | Refills: 1 | Status: SHIPPED | OUTPATIENT
Start: 2019-10-29

## 2019-10-28 RX ORDER — HYDROXYZINE HYDROCHLORIDE 25 MG/1
25 TABLET, FILM COATED ORAL 3 TIMES DAILY
Status: DISCONTINUED | OUTPATIENT
Start: 2019-10-28 | End: 2019-10-29 | Stop reason: HOSPADM

## 2019-10-28 RX ORDER — LANOLIN ALCOHOL/MO/W.PET/CERES
100 CREAM (GRAM) TOPICAL DAILY
Qty: 30 TABLET | Refills: 1 | Status: SHIPPED | OUTPATIENT
Start: 2019-10-29

## 2019-10-28 RX ORDER — MIRTAZAPINE 15 MG/1
15 TABLET, FILM COATED ORAL
Qty: 30 TABLET | Refills: 1 | Status: SHIPPED | OUTPATIENT
Start: 2019-10-29

## 2019-10-28 RX ORDER — HYDROXYZINE HYDROCHLORIDE 25 MG/1
25 TABLET, FILM COATED ORAL 3 TIMES DAILY
Qty: 30 TABLET | Refills: 1 | Status: SHIPPED | OUTPATIENT
Start: 2019-10-29

## 2019-10-28 RX ADMIN — DIVALPROEX SODIUM 1000 MG: 500 TABLET, DELAYED RELEASE ORAL at 17:30

## 2019-10-28 RX ADMIN — Medication 1 TABLET: at 09:37

## 2019-10-28 RX ADMIN — NICOTINE 1 PATCH: 21 PATCH, EXTENDED RELEASE TRANSDERMAL at 09:38

## 2019-10-28 RX ADMIN — HYDROXYZINE HYDROCHLORIDE 25 MG: 25 TABLET ORAL at 21:06

## 2019-10-28 RX ADMIN — QUETIAPINE FUMARATE 50 MG: 50 TABLET ORAL at 16:00

## 2019-10-28 RX ADMIN — MIRTAZAPINE 15 MG: 15 TABLET, FILM COATED ORAL at 21:06

## 2019-10-28 RX ADMIN — ROPINIROLE HYDROCHLORIDE 0.5 MG: 1 TABLET, FILM COATED ORAL at 21:06

## 2019-10-28 RX ADMIN — HYDROXYZINE HYDROCHLORIDE 25 MG: 25 TABLET ORAL at 09:37

## 2019-10-28 RX ADMIN — FOLIC ACID 1 MG: 1 TABLET ORAL at 09:36

## 2019-10-28 RX ADMIN — ATORVASTATIN CALCIUM 40 MG: 40 TABLET, FILM COATED ORAL at 09:37

## 2019-10-28 RX ADMIN — THIAMINE HCL TAB 100 MG 100 MG: 100 TAB at 09:36

## 2019-10-28 RX ADMIN — DIVALPROEX SODIUM 1000 MG: 500 TABLET, DELAYED RELEASE ORAL at 09:36

## 2019-10-28 RX ADMIN — QUETIAPINE FUMARATE 350 MG: 50 TABLET ORAL at 21:06

## 2019-10-28 RX ADMIN — HYDROXYZINE HYDROCHLORIDE 25 MG: 25 TABLET ORAL at 16:10

## 2019-10-28 RX ADMIN — BENZTROPINE MESYLATE 1 MG: 1 TABLET ORAL at 21:06

## 2019-10-28 NOTE — PROGRESS NOTES
10/28/19 0800   Team Meeting   Meeting Type Daily Rounds   Team Members Present   Team Members Present Physician;Nurse;;; Other (Discipline and Name)   Physician Team Member Adam Duarte Team Member [facility] [place] [facility] Team Member Jimmy   Social Work Team Member Janelle Jean   Patient/Family Present   Patient Present No   Patient's Family Present No     D/C CIWA precautions   Added Atarax for anxiety  Recheck Depakote level tomorrow  D/C tomorrow

## 2019-10-28 NOTE — PROGRESS NOTES
[facility] GROUP NOTE     10/28/19 0900   Activity/Group Checklist   Group Admission/Discharge  (completed DC DERs)   Attendance Attended   Attendance Duration (min) 0-15   Interactions Interacted appropriately   Affect/Mood Appropriate

## 2019-10-28 NOTE — CASE MANAGEMENT
SW spoke with Pt in regards to following through with medical assistance, calling [facility] to follow through with referral made  Information was given on [facility], suicide helpline, crisis, and shelters

## 2019-10-28 NOTE — PROGRESS NOTES
Pt denies all symptoms  Pt is seclusive to room with brief intervals in the milieu  Pt is calm and cooperative  Compliant with medications  Will monitor

## 2019-10-28 NOTE — PROGRESS NOTES
Pharmacy Medication Education Group     Patient came to group and asked "so what I am trying to figure out is how these medications are going to mix with my Brooksie Bathe? I guess it will be trial and error" he also stated "none of this matters because I am going to stop all of my medications anyway and start drinking, I want a drink right now "     Patient was counseled on hypotension risk and additive side effects of alcohol when mixed with medication and that this can be a very dangerous combination  As group went on the patient stated the burden of having to take a medication each day which he did not want to do  Patient was counseled that every single day we have to put water in our body, and food and diabetic people have to prick themselves and check sugars so taking a pill each day is much easier by comparison  Patient stated "yeah you're right, my son is a diabetic, I guess you're right I just have to work on this alcohol thing"     Patient was counseled on naltrexone and how it can help take away cravings  Another patient joined in and stated it helped them  The patient had this writer write down the name of this medication and said "thanks it might be something I try on the outside because I just want to leave and get back to work "     Patient clearly lacks insight into condition and wants to keep drinking alcohol  Patient was otherwise behaviorally appropriate during  This period of group       Malachi London, PharmD, BCPP, Clinical Pharmacist - Psychiatry
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
## 2019-10-28 NOTE — PLAN OF CARE
Pt is at baseline, as per treatment team and daughter's input  Pt states he will not follow through with D&A suggestions for treatment and will most likely not continue with mental health medications/treatment  Pt verbalizes he will begin drinking upon discharge but will refrain from using meth  Problem: Depression  Goal: Verbalize thoughts and feelings  Description  Interventions:  - Assess and re-assess patient's level of risk   - Engage patient in 1:1 interactions, daily, for a minimum of 15 minutes   - Encourage patient to express feelings, fears, frustrations, hopes   Outcome: Progressing  Goal: Refrain from harming self  Description  Interventions:  - Monitor patient closely, per order   - Supervise medication ingestion, monitor effects and side effects   Outcome: Progressing  Goal: Refrain from isolation  Description  Interventions:  - Develop a trusting relationship   - Encourage socialization   Outcome: Progressing     Problem: Ineffective Coping  Goal: Participates in unit activities  Description  Interventions:  - Provide therapeutic environment   - Provide required programming   - Redirect inappropriate behaviors   Outcome: Progressing     Problem: SUBSTANCE USE/ABUSE  Goal: Will have no detox symptoms and will verbalize plan for changing substance-related behavior  Description  INTERVENTIONS:  - Monitor physical status and assess for symptoms of withdrawal  - Administer medication as ordered  - Provide emotional support with 1 on 1 interaction with staff  - Encourage recovery focused program/ addiction education  - Assess for verbalization of changing behaviors related to substance abuse  - Initiate consults and referrals as appropriate (Case Management, Spiritual Care, etc )  -Goal extended on 10/23/2019   Outcome: Progressing  Variance Patient Uncooperative  Impact: Minimal  Note:   Pt verbalizes plan and is aware D&A have negative effects on Pt life   However, Pt verbalizes he will being drinking again after discharge  Pt states he will not use meth again  Goal: By discharge, will develop insight into their chemical dependency and sustain motivation to continue in recovery  Description  INTERVENTIONS:  - Attends all daily group sessions and scheduled AA groups  - Actively practices coping skills through participation in the therapeutic community and adherence to program rules  - Reviews and completes assignments from individual treatment plan  - Assist patient development of understanding of their personal cycle of addiction and relapse triggers  -Goal extended on 10/23/2019   Outcome: Progressing  Variances  Slow or unresponsive to therapy  Impact: Minimal  Patient Uncooperative  Impact: Minimal  Note:   Pt verbalizes plan and is aware D&A have negative effects on Pt life  However, Pt verbalizes he will being drinking again after discharge  Pt states he will not use meth again  Goal: By discharge, patient will have ongoing treatment plan addressing chemical dependency  Description  INTERVENTIONS:  - Assist patient with resources and/or appointments for ongoing recovery based living  -Goal extended on 10/23/2019   Variances  Patient Uncooperative  Impact: Minimal  Patient/family refused or delayed decision  Note:   Pt verbalizes plan and is aware D&A have negative effects on Pt life  However, Pt verbalizes he will being drinking again after discharge  Pt states he will not use meth again

## 2019-10-28 NOTE — PLAN OF CARE
Problem: Depression  Goal: Treatment Goal: Demonstrate behavioral control of depressive symptoms, verbalize feelings of improved mood/affect, and adopt new coping skills prior to discharge  Outcome: Progressing  Goal: Verbalize thoughts and feelings  Description  Interventions:  - Assess and re-assess patient's level of risk   - Engage patient in 1:1 interactions, daily, for a minimum of 15 minutes   - Encourage patient to express feelings, fears, frustrations, hopes   Outcome: Progressing  Goal: Refrain from harming self  Description  Interventions:  - Monitor patient closely, per order   - Supervise medication ingestion, monitor effects and side effects   Outcome: Progressing  Goal: Refrain from self-neglect  Outcome: Progressing     Problem: SUBSTANCE USE/ABUSE  Goal: Will have no detox symptoms and will verbalize plan for changing substance-related behavior  Description  INTERVENTIONS:  - Monitor physical status and assess for symptoms of withdrawal  - Administer medication as ordered  - Provide emotional support with 1 on 1 interaction with staff  - Encourage recovery focused program/ addiction education  - Assess for verbalization of changing behaviors related to substance abuse  - Initiate consults and referrals as appropriate (Case Management, Spiritual Care, etc )  -Goal extended on 10/23/2019   Outcome: Progressing  Goal: By discharge, patient will have ongoing treatment plan addressing chemical dependency  Description  INTERVENTIONS:  - Assist patient with resources and/or appointments for ongoing recovery based living  -Goal extended on 10/23/2019   Outcome: Progressing     Problem: Nutrition/Hydration-ADULT  Goal: Nutrient/Hydration intake appropriate for improving, restoring or maintaining nutritional needs  Description  Monitor and assess patient's nutrition/hydration status for malnutrition  Collaborate with interdisciplinary team and initiate plan and interventions as ordered    Monitor patient's weight and dietary intake as ordered or per policy  Utilize nutrition screening tool and intervene as necessary  Determine patient's food preferences and provide high-protein, high-caloric foods as appropriate       INTERVENTIONS:  - Monitor oral intake, urinary output, labs, and treatment plans  - Assess nutrition and hydration status and recommend course of action  - Evaluate amount of meals eaten  - Assist patient with eating if necessary   - Allow adequate time for meals  - Recommend/ encourage appropriate diets, oral nutritional supplements, and vitamin/mineral supplements  - Order, calculate, and assess calorie counts as needed  - Recommend, monitor, and adjust tube feedings and TPN/PPN based on assessed needs  - Assess need for intravenous fluids  - Provide specific nutrition/hydration education as appropriate  - Include patient/family/caregiver in decisions related to nutrition  Outcome: Progressing     Problem: Depression  Goal: Refrain from isolation  Description  Interventions:  - Develop a trusting relationship   - Encourage socialization   Outcome: Not Progressing     Problem: SUBSTANCE USE/ABUSE  Goal: By discharge, will develop insight into their chemical dependency and sustain motivation to continue in recovery  Description  INTERVENTIONS:  - Attends all daily group sessions and scheduled AA groups  - Actively practices coping skills through participation in the therapeutic community and adherence to program rules  - Reviews and completes assignments from individual treatment plan  - Assist patient development of understanding of their personal cycle of addiction and relapse triggers  -Goal extended on 10/23/2019   Outcome: Not Progressing

## 2019-10-28 NOTE — PROGRESS NOTES
Pt is compliant with medications, he is seclusive to his room, hopeful for discharge soon  He states he no longer feels suicidal, states his job has been very supportive and wants him back and is able to see that he is valued and has purpose in life  During conversation David Florse was reminding pt about vitamin deficits from alcohol abuse and pt was quiet, appears uninterested in talking about substance abuse

## 2019-10-29 VITALS
TEMPERATURE: 98 F | BODY MASS INDEX: 22.66 KG/M2 | HEART RATE: 73 BPM | RESPIRATION RATE: 16 BRPM | OXYGEN SATURATION: 97 % | DIASTOLIC BLOOD PRESSURE: 63 MMHG | WEIGHT: 171 LBS | HEIGHT: 73 IN | SYSTOLIC BLOOD PRESSURE: 104 MMHG

## 2019-10-29 LAB
ALBUMIN SERPL BCP-MCNC: 3.7 G/DL (ref 3–5.2)
ALP SERPL-CCNC: 53 U/L (ref 43–122)
ALT SERPL W P-5'-P-CCNC: 37 U/L (ref 9–52)
ANION GAP SERPL CALCULATED.3IONS-SCNC: 6 MMOL/L (ref 5–14)
AST SERPL W P-5'-P-CCNC: 28 U/L (ref 17–59)
BASOPHILS # BLD AUTO: 0.1 THOUSANDS/ΜL (ref 0–0.1)
BASOPHILS NFR BLD AUTO: 1 % (ref 0–1)
BILIRUB SERPL-MCNC: 0.4 MG/DL
BUN SERPL-MCNC: 12 MG/DL (ref 5–25)
CALCIUM SERPL-MCNC: 9.2 MG/DL (ref 8.4–10.2)
CHLORIDE SERPL-SCNC: 101 MMOL/L (ref 97–108)
CO2 SERPL-SCNC: 32 MMOL/L (ref 22–30)
CREAT SERPL-MCNC: 0.85 MG/DL (ref 0.7–1.5)
EOSINOPHIL # BLD AUTO: 0.3 THOUSAND/ΜL (ref 0–0.4)
EOSINOPHIL NFR BLD AUTO: 4 % (ref 0–6)
ERYTHROCYTE [DISTWIDTH] IN BLOOD BY AUTOMATED COUNT: 12.6 %
GFR SERPL CREATININE-BSD FRML MDRD: 100 ML/MIN/1.73SQ M
GLUCOSE P FAST SERPL-MCNC: 102 MG/DL (ref 70–99)
GLUCOSE SERPL-MCNC: 102 MG/DL (ref 70–99)
HCT VFR BLD AUTO: 42.5 % (ref 41–53)
HGB BLD-MCNC: 14.5 G/DL (ref 13.5–17.5)
LYMPHOCYTES # BLD AUTO: 2.6 THOUSANDS/ΜL (ref 0.5–4)
LYMPHOCYTES NFR BLD AUTO: 40 % (ref 25–45)
MCH RBC QN AUTO: 34.6 PG (ref 26–34)
MCHC RBC AUTO-ENTMCNC: 34 G/DL (ref 31–36)
MCV RBC AUTO: 102 FL (ref 80–100)
MONOCYTES # BLD AUTO: 0.6 THOUSAND/ΜL (ref 0.2–0.9)
MONOCYTES NFR BLD AUTO: 9 % (ref 1–10)
NEUTROPHILS # BLD AUTO: 3 THOUSANDS/ΜL (ref 1.8–7.8)
NEUTS SEG NFR BLD AUTO: 46 % (ref 45–65)
PLATELET # BLD AUTO: 252 THOUSANDS/UL (ref 150–450)
PLATELET BLD QL SMEAR: ADEQUATE
PMV BLD AUTO: 8.4 FL (ref 8.9–12.7)
POTASSIUM SERPL-SCNC: 4.6 MMOL/L (ref 3.6–5)
PROT SERPL-MCNC: 6.3 G/DL (ref 5.9–8.4)
RBC # BLD AUTO: 4.18 MILLION/UL (ref 4.5–5.9)
RBC MORPH BLD: NORMAL
SODIUM SERPL-SCNC: 139 MMOL/L (ref 137–147)
VALPROATE SERPL-MCNC: 92.8 UG/ML (ref 50–120)
WBC # BLD AUTO: 6.5 THOUSAND/UL (ref 4.5–11)

## 2019-10-29 PROCEDURE — 80164 ASSAY DIPROPYLACETIC ACD TOT: CPT | Performed by: PSYCHIATRY & NEUROLOGY

## 2019-10-29 PROCEDURE — 99238 HOSP IP/OBS DSCHRG MGMT 30/<: CPT | Performed by: NURSE PRACTITIONER

## 2019-10-29 PROCEDURE — 80053 COMPREHEN METABOLIC PANEL: CPT | Performed by: PSYCHIATRY & NEUROLOGY

## 2019-10-29 PROCEDURE — 85025 COMPLETE CBC W/AUTO DIFF WBC: CPT | Performed by: PSYCHIATRY & NEUROLOGY

## 2019-10-29 RX ADMIN — FOLIC ACID 1 MG: 1 TABLET ORAL at 08:21

## 2019-10-29 RX ADMIN — HYDROXYZINE HYDROCHLORIDE 25 MG: 25 TABLET ORAL at 08:21

## 2019-10-29 RX ADMIN — ATORVASTATIN CALCIUM 40 MG: 40 TABLET, FILM COATED ORAL at 08:21

## 2019-10-29 RX ADMIN — DIVALPROEX SODIUM 1000 MG: 500 TABLET, DELAYED RELEASE ORAL at 08:21

## 2019-10-29 RX ADMIN — Medication 1 TABLET: at 08:21

## 2019-10-29 RX ADMIN — NICOTINE 1 PATCH: 21 PATCH, EXTENDED RELEASE TRANSDERMAL at 08:21

## 2019-10-29 RX ADMIN — THIAMINE HCL TAB 100 MG 100 MG: 100 TAB at 08:21

## 2019-10-29 NOTE — PROGRESS NOTES
Pt is bright, pleasant, excited to be discharged  He denies all S/S, states he is going back to work ASAP and has the support of his employer

## 2019-10-29 NOTE — PLAN OF CARE
Problem: Depression  Goal: Treatment Goal: Demonstrate behavioral control of depressive symptoms, verbalize feelings of improved mood/affect, and adopt new coping skills prior to discharge  Outcome: Completed  Goal: Verbalize thoughts and feelings  Description  Interventions:  - Assess and re-assess patient's level of risk   - Engage patient in 1:1 interactions, daily, for a minimum of 15 minutes   - Encourage patient to express feelings, fears, frustrations, hopes   Outcome: Completed  Goal: Refrain from harming self  Description  Interventions:  - Monitor patient closely, per order   - Supervise medication ingestion, monitor effects and side effects   Outcome: Completed  Goal: Refrain from isolation  Description  Interventions:  - Develop a trusting relationship   - Encourage socialization   Outcome: Completed  Goal: Refrain from self-neglect  Outcome: Completed     Problem: SUBSTANCE USE/ABUSE  Goal: Will have no detox symptoms and will verbalize plan for changing substance-related behavior  Description  INTERVENTIONS:  - Monitor physical status and assess for symptoms of withdrawal  - Administer medication as ordered  - Provide emotional support with 1 on 1 interaction with staff  - Encourage recovery focused program/ addiction education  - Assess for verbalization of changing behaviors related to substance abuse  - Initiate consults and referrals as appropriate (Case Management, Spiritual Care, etc )  -Goal extended on 10/23/2019   Outcome: Completed  Goal: By discharge, will develop insight into their chemical dependency and sustain motivation to continue in recovery  Description  INTERVENTIONS:  - Attends all daily group sessions and scheduled AA groups  - Actively practices coping skills through participation in the therapeutic community and adherence to program rules  - Reviews and completes assignments from individual treatment plan  - Assist patient development of understanding of their personal cycle of addiction and relapse triggers  -Goal extended on 10/23/2019   Outcome: Completed  Goal: By discharge, patient will have ongoing treatment plan addressing chemical dependency  Description  INTERVENTIONS:  - Assist patient with resources and/or appointments for ongoing recovery based living  -Goal extended on 10/23/2019   Outcome: Completed     Problem: Nutrition/Hydration-ADULT  Goal: Nutrient/Hydration intake appropriate for improving, restoring or maintaining nutritional needs  Description  Monitor and assess patient's nutrition/hydration status for malnutrition  Collaborate with interdisciplinary team and initiate plan and interventions as ordered  Monitor patient's weight and dietary intake as ordered or per policy  Utilize nutrition screening tool and intervene as necessary  Determine patient's food preferences and provide high-protein, high-caloric foods as appropriate       INTERVENTIONS:  - Monitor oral intake, urinary output, labs, and treatment plans  - Assess nutrition and hydration status and recommend course of action  - Evaluate amount of meals eaten  - Assist patient with eating if necessary   - Allow adequate time for meals  - Recommend/ encourage appropriate diets, oral nutritional supplements, and vitamin/mineral supplements  - Order, calculate, and assess calorie counts as needed  - Recommend, monitor, and adjust tube feedings and TPN/PPN based on assessed needs  - Assess need for intravenous fluids  - Provide specific nutrition/hydration education as appropriate  - Include patient/family/caregiver in decisions related to nutrition  Outcome: Completed     Problem: DISCHARGE PLANNING  Goal: Discharge to home or other facility with appropriate resources  Description  INTERVENTIONS:  - Identify barriers to discharge w/patient and caregiver  - Arrange for needed discharge resources and transportation as appropriate  - Identify discharge learning needs (meds, wound care, etc )  - Refer to Case Management Department for coordinating discharge planning if the patient needs post-hospital services based on physician/advanced practitioner order or complex needs related to functional status, cognitive ability, or social support system   Outcome: Completed     Problem: Ineffective Coping  Goal: Participates in unit activities  Description  Interventions:  - Provide therapeutic environment   - Provide required programming   - Redirect inappropriate behaviors   Outcome: Completed
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
## 2019-10-29 NOTE — BH TRANSITION RECORD
Contact Information: If you have any questions, concerns, pended studies, tests and/or procedures, or emergencies regarding your inpatient behavioral health visit  Please contact [facility] behavioral health unit 3B [phone]  and ask to speak to a , nurse or physician  A contact is available 24 hours/ 7 days a week at this number  Summary of Procedures Performed During your Stay:  Below is a list of major procedures performed during your hospital stay and a summary of results:  - Cardiac Procedures/Studies: EKG  Pending Studies (From admission, onward)     Start     Ordered    10/29/19 0000  Valproic acid level, total      10/29/19 1023    10/29/19 8010  Basic metabolic panel     Comments: This is a patient instruction: Patient fasting for 8 hours or longer recommended  10/29/19 1023              If studies are pending at discharge, follow up with your PCP and/or referring provider

## 2019-10-29 NOTE — NURSING NOTE
AVS and prescriptions reviewed with pt  Pt verbalized understanding, no questions or concerns  Pt left unit walking, denied all S/S at time of discharge, left with all belongings

## 2019-10-29 NOTE — CASE MANAGEMENT
Voicemail from [employer], [address] returning call on intake  SW called [employer] and left message with Pt discharge location and cell phone number  The [address] [place] [address] and [address] [place] [address]   [phone]
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
## 2019-10-29 NOTE — DISCHARGE INSTRUCTIONS
Quetiapine (By mouth)   Quetiapine Fumarate (hnn-RJD-t-peen FUE-ma-rate)  Treats schizophrenia, bipolar disorder, or depression  Brand Name(s): SEROquel, SEROquel XR, Seroquel XR 14-Day Sample Kit   There may be other brand names for this medicine  When This Medicine Should Not Be Used: This medicine is not right for everyone  Do not use if you had an allergic reaction to quetiapine  How to Use This Medicine:   Tablet, Long Acting Tablet  · Take your medicine as directed  Your dose may need to be changed several times to find what works best for you  You need to start with a low dose, even if you have used this medicine before  · Your doctor may tell you to take the medicine at bedtime, because quetiapine can make you sleepy  · Extended-release tablets: Take this medicine either without food or with a light snack (approximately 300 calories)  · Swallow the extended-release tablet whole  Do not crush, break, or chew it  · This medicine should come with a Medication Guide  Ask your pharmacist for a copy if you do not have one  · Missed dose: Take a dose as soon as you remember  If it is almost time for your next dose, wait until then and take a regular dose  Do not take extra medicine to make up for a missed dose  · Store the medicine in a closed container at room temperature, away from heat, moisture, and direct light  Drugs and Foods to Avoid:   Ask your doctor or pharmacist before using any other medicine, including over-the-counter medicines, vitamins, and herbal products  · Some medicines can affect how quetiapine works   Tell your doctor if you are using any of the following:  ¨ Levodopa, methadone, nefazodone, rifampin, Sang's wort  ¨ Blood pressure medicine  ¨ Medicine for heart rhythm problems (including amiodarone, procainamide, quinidine, sotalol)  ¨ Medicine for seizures (including carbamazepine, phenobarbital, phenytoin)  ¨ Medicine to treat an infection (including gatifloxacin, itraconazole, ketoconazole, moxifloxacin, pentamidine)  ¨ Medicine to treat HIV/AIDS (including indinavir, ritonavir)  ¨ Other medicine to treat mental health problems (including chlorpromazine, thioridazine, ziprasidone)  · Tell your doctor if you use anything else that makes you sleepy  Some examples are allergy medicine, narcotic pain medicine, and alcohol  · Do not drink alcohol while you are using this medicine  Warnings While Using This Medicine:   · Tell your doctor if you are pregnant or breastfeeding, or if you have liver disease, breast cancer, diabetes, underactive thyroid, or a history of seizures or neuroleptic malignant syndrome (NMS)  Tell your doctor if you have any kind of blood vessel or heart problems, including low or high blood pressure, heart failure, heart rhythm problems (QT prolongation, slow heartbeat), high cholesterol, or a history of heart attack or stroke  · This medicine may cause the following problems:  ¨ Neuroleptic malignant syndrome (a nerve and muscle problem)  ¨ High blood sugar, cholesterol, or triglyceride levels  ¨ Tardive dyskinesia (a muscle problem that may become permanent)  ¨ Changes in blood pressure, especially in children  ¨ Heart rhythm changes  · This medicine may cause depression or thoughts of suicide  Make sure family members know about this  Always tell your doctor about any behavior changes, depression, intense feelings, or thoughts of hurting yourself or others  · This medicine may make you dizzy or drowsy, or may cause trouble with thinking or controlling body movements, which may lead to falls, fractures or other injuries  Do not drive or do anything that could be dangerous until you know how this medicine affects you  You may also feel lightheaded when getting up suddenly from a lying or sitting position, so stand up slowly  · This medicine may make you bleed, bruise, or get infections more easily   Take precautions to prevent illness and injury  Wash your hands often  · This medicine may make it more difficult for your body to cool down  Be careful to not become overheated during exercise or hot weather, because you could have heat stroke  · Do not stop using this medicine suddenly  Your doctor will need to slowly decrease your dose before you stop it completely  · Your doctor will do lab tests at regular visits to check on the effects of this medicine  Keep all appointments  You may also need to have your eyes tested on a regular basis  · Tell any doctor or dentist who treats you that you are using this medicine  This medicine may affect certain medical test results  · Keep all medicine out of the reach of children  Never share your medicine with anyone  Possible Side Effects While Using This Medicine:   Call your doctor right away if you notice any of these side effects:  · Allergic reaction: Itching or hives, swelling in your face or hands, swelling or tingling in your mouth or throat, chest tightness, trouble breathing  · Changes in mood or behavior, agitation, anxiety, restlessness, or thoughts of hurting yourself or others  · Constant muscle movement that you cannot control (often in your lips, tongue, jaw, arms, or legs)  · Fast, slow, pounding, or uneven heartbeat  · Fever, chills, cough, sore throat, and body aches  · Fever, sweating, confusion, uneven heartbeat, muscle stiffness  · Increase in how much or how often you urinate, increased thirst, increased hunger, or weakness  · Lightheadedness, dizziness, fainting, or clumsiness  · Seizures  · Vision changes  If you notice these less serious side effects, talk with your doctor:   · Constipation, vomiting, nausea, dry mouth  · Headache  · Tiredness, dizziness, or sleepiness  · Trouble swallowing  · Weight gain  If you notice other side effects that you think are caused by this medicine, tell your doctor  Call your doctor for medical advice about side effects   You may report side effects to FDA at 2-254-FDA-1088  © 2017 [address] [employer] Information is for End User's use only and may not be sold, redistributed or otherwise used for commercial purposes  The above information is an  only  It is not intended as medical advice for individual conditions or treatments  Talk to your doctor, nurse or pharmacist before following any medical regimen to see if it is safe and effective for you  Mirtazapine (By mouth)   Mirtazapine (jaa-JPS-u-peen)  Treats depression  Brand Name(s): Remeron, Remeron Soltab   There may be other brand names for this medicine  When This Medicine Should Not Be Used: This medicine is not right for everyone  Do not use it if you had an allergic reaction to mirtazapine  How to Use This Medicine:   Tablet, Dissolving Tablet  · Take your medicine as directed  Your dose may need to be changed several times to find what works best for you  Your doctor may tell you to take this medicine at bedtime, because it can make you sleepy  · You may need to take this medicine for several weeks before you begin to feel better  · Make sure your hands are dry before you handle the disintegrating tablet  Peel back the foil from the blister pack, then remove the tablet  Do not push the tablet through the foil  Place the tablet in your mouth  After it has melted, swallow or take a drink of water  Do not crush, split, or break the tablet  · This medicine should come with a Medication Guide  Ask your pharmacist for a copy if you do not have one  · Missed dose: Take a dose as soon as you remember  If it is almost time for your next dose, wait until then and take a regular dose  Do not take extra medicine to make up for a missed dose  · Store the medicine in a closed container at room temperature, away from heat, moisture, and direct light  Keep the orally disintegrating tablet in the original package until you are ready to take it    Drugs and Foods to Avoid: Ask your doctor or pharmacist before using any other medicine, including over-the-counter medicines, vitamins, and herbal products  · Do not use this medicine and an MAO inhibitor within 14 days of each other  · Some medicines can affect how mirtazapine works  Tell your doctor if you are using any of the following:  ¨ Buspirone, carbamazepine, cimetidine, diazepam, fentanyl, lithium, nefazodone, phenytoin, rifampicin, Sang's wort, tramadol, or tryptophan  ¨ Other medicine to treat depression, a triptan medicine to treat migraine headaches, medicine to treat an infection, medicine to treat HIV, or a blood thinner (such as warfarin)  · Do not drink alcohol while you are using this medicine  Warnings While Using This Medicine:   · Tell your doctor if you are pregnant or breastfeeding, or if you have kidney disease, liver disease, glaucoma, high cholesterol, heart or blood vessel disease, or a history of seizures, heart attack, or stroke  Tell your doctor if you have phenylketonuria  · For some children, teenagers, and young adults, this medicine may increase mental or emotional problems  This may lead to thoughts of suicide and violence  Talk with your doctor right away if you have any thoughts or behavior changes that concern you  Tell your doctor if you or anyone in your family has a history of bipolar disorder or suicide attempts  · This medicine may cause the following problems:  ¨ Serotonin syndrome (may be life-threatening)  ¨ Decreased white blood cells, which can affect your body's ability to fight an infection  ¨ Low sodium levels in the blood  · Do not stop using this medicine suddenly  Your doctor will need to slowly decrease your dose before you stop it completely  · This medicine may make you dizzy or drowsy  Do not drive or do anything else that could be dangerous until you know how this medicine affects you  Stand or sit up slowly if you feel lightheaded or dizzy    · Your doctor will do lab tests at regular visits to check on the effects of this medicine  Keep all appointments  · Keep all medicine out of the reach of children  Never share your medicine with anyone  Possible Side Effects While Using This Medicine:   Call your doctor right away if you notice any of these side effects:  · Allergic reaction: Itching or hives, swelling in your face or hands, swelling or tingling in your mouth or throat, chest tightness, trouble breathing  · Anxiety, restlessness, fast heartbeat, fever, sweating, muscle spasms, nausea, vomiting, diarrhea, seeing or hearing things that are not there  · Blistering, peeling, red skin rash  · Eye pain, vision changes, seeing halos around lights  · Confusion, weakness, muscle twitching  · Feeling more excited or energetic than usual  · Fever, chills, cough, sore throat, body aches  · Thoughts of hurting yourself or others, worsening depression, unusual behaviors  If you notice these less serious side effects, talk with your doctor:   · Dry mouth, constipation  · Increased appetite or weight gain  · Sleepiness, tiredness  If you notice other side effects that you think are caused by this medicine, tell your doctor  Call your doctor for medical advice about side effects  You may report side effects to FDA at 1-181-FDA-7197  © 2017 [address]  Information is for End User's use only and may not be sold, redistributed or otherwise used for commercial purposes  The above information is an  only  It is not intended as medical advice for individual conditions or treatments  Talk to your doctor, nurse or pharmacist before following any medical regimen to see if it is safe and effective for you  Valproic Acid (By mouth)   Valproic Acid (melissa-PROE-ik AS-id)  Treats seizures  Also treats bipolar disorder and helps prevent migraine headaches     Brand Name(s): Depakene, Depakote Sprinkles, Stavzor   There may be other brand names for this medicine  When This Medicine Should Not Be Used: This medicine is not right for everyone  Do not use it if you had an allergic reaction to divalproex, sodium valproate, or valproic acid  Do not use it if you are pregnant, or if you have liver disease or certain genetic disorders (such as urea cycle or mitochondrial disorder)  How to Use This Medicine:   Delayed Release Capsule, Liquid Filled Capsule, Liquid  · Take your medicine as directed  Your dose may need to be changed several times to find what works best for you  · You may take this medicine with food to decrease stomach upset  · Capsule: Swallow it whole  Do not crush, break, or chew it  · Oral liquid: Measure the oral liquid medicine with a marked measuring spoon, oral syringe, or medicine cup  · This medicine should come with a Medication Guide  Ask your pharmacist for a copy if you do not have one  · Missed dose: Take a dose as soon as you remember  If it is almost time for your next dose, wait until then and take a regular dose  Do not take extra medicine to make up for a missed dose  · Store the medicine in a closed container at room temperature, away from heat, moisture, and direct light  Drugs and Foods to Avoid:   Ask your doctor or pharmacist before using any other medicine, including over-the-counter medicines, vitamins, and herbal products  · Some medicines can affect how valproic acid works  Tell your doctor if you are using any of the following:   ¨ Aspirin, rifampin, rufinamide, tolbutamide, zidovudine  ¨ Carbapenem antibiotic (including ertapenem, imipenem, meropenem)  ¨ Other seizure medicine (including carbamazepine, felbamate, phenobarbital, phenytoin, primidone)  ¨ Blood thinner (including warfarin)  · Alcohol, narcotic pain relievers, or sleeping pills may cause you to feel more lightheaded, dizzy, or faint when used together with this medicine    Warnings While Using This Medicine:   · It is not safe to take this medicine during pregnancy  It could harm an unborn baby  Tell your doctor right away if you become pregnant  · Tell your doctor if you are breastfeeding, or if you have kidney disease, blood disease, or pancreas problems  · This medicine can increase depression and thoughts of suicide  Tell your doctor if you have a history of depression or mental health problems  · This medicine may cause the following problems:  ¨ Liver problems  ¨ Pancreatitis  ¨ Hyperammonemic encephalopathy (too much ammonia in your blood)  ¨ Thrombocytopenia (decrease in blood cells that affect clotting)  ¨ Hypothermia (low body temperature)  ¨ Drug reaction with eosinophilia and systemic symptoms (DRESS), which may damage organs such as the liver, kidney, or heart  · This medicine may make you dizzy or drowsy  Do not drive or do anything else that could be dangerous until you know how this medicine affects you  · Do not stop using this medicine suddenly  Your doctor will need to slowly decrease your dose before you stop it completely  · Tell any doctor or dentist who treats you that you are using this medicine  This medicine may affect certain medical test results  · Your doctor will check your progress and the effects of this medicine at regular visits  Keep all appointments  · Keep all medicine out of the reach of children  Never share your medicine with anyone    Possible Side Effects While Using This Medicine:   Call your doctor right away if you notice any of these side effects:  · Allergic reaction: Itching or hives, swelling in your face or hands, swelling or tingling in your mouth or throat, chest tightness, trouble breathing  · Blistering, peeling, or red skin rash  · Confusion, problems with memory, unusual drowsiness, clumsiness  · Dark urine or pale stools, loss of appetite, stomach pain, yellow skin or eyes  · Fever, rash, swollen glands in the neck, armpits, or groin  · Sudden and severe stomach pain, nausea, vomiting  · Thoughts of hurting yourself, depression, unusual changes in behavior or moods  · Unusual bleeding, bruising, or weakness  If you notice these less serious side effects, talk with your doctor:   · Diarrhea, mild stomach pain or upset  · Hair loss  · Tiredness, sleepiness  · Trouble sleeping, tremor  · Vision changes, dizziness, headache  If you notice other side effects that you think are caused by this medicine, tell your doctor  Call your doctor for medical advice about side effects  You may report side effects to FDA at 2-439-FDA-5329  © 2017 [address] [employer] Information is for End User's use only and may not be sold, redistributed or otherwise used for commercial purposes  The above information is an  only  It is not intended as medical advice for individual conditions or treatments  Talk to your doctor, nurse or pharmacist before following any medical regimen to see if it is safe and effective for you

## 2023-04-26 NOTE — CASE MANAGEMENT
SW met with Pt who was in good spirits, joking with therapist and Roula Pedersen is now agreeable to continuing with medication upon discharge, although Pt is honest about continuing to drink alcohol upon discharge   Pt believes he is ready to leave and verbalizes not being actively suicidal  yes